# Patient Record
Sex: MALE | Race: WHITE | NOT HISPANIC OR LATINO | Employment: OTHER | URBAN - METROPOLITAN AREA
[De-identification: names, ages, dates, MRNs, and addresses within clinical notes are randomized per-mention and may not be internally consistent; named-entity substitution may affect disease eponyms.]

---

## 2017-06-20 ENCOUNTER — OFFICE VISIT (OUTPATIENT)
Dept: URGENT CARE | Facility: CLINIC | Age: 67
End: 2017-06-20
Payer: MEDICARE

## 2017-06-20 VITALS
HEART RATE: 73 BPM | SYSTOLIC BLOOD PRESSURE: 150 MMHG | DIASTOLIC BLOOD PRESSURE: 70 MMHG | TEMPERATURE: 98 F | BODY MASS INDEX: 27.65 KG/M2 | OXYGEN SATURATION: 95 % | WEIGHT: 184.5 LBS

## 2017-06-20 DIAGNOSIS — E11.8 TYPE 2 DIABETES MELLITUS WITH COMPLICATION, UNSPECIFIED LONG TERM INSULIN USE STATUS: ICD-10-CM

## 2017-06-20 DIAGNOSIS — I25.10 CORONARY ARTERIOSCLEROSIS IN NATIVE ARTERY: ICD-10-CM

## 2017-06-20 PROBLEM — F19.20 ADDICTION TO DRUG: Status: ACTIVE | Noted: 2017-04-13

## 2017-06-20 PROBLEM — S72.143A CLOSED INTERTROCHANTERIC FRACTURE OF FEMUR: Status: ACTIVE | Noted: 2017-05-10

## 2017-06-20 PROBLEM — K57.90 DIVERTICULOSIS OF INTESTINE: Status: ACTIVE | Noted: 2017-06-20

## 2017-06-20 PROBLEM — R09.89 PULMONARY CONGESTION: Status: ACTIVE | Noted: 2017-05-26

## 2017-06-20 PROBLEM — R00.1 BRADYCARDIA: Status: ACTIVE | Noted: 2017-05-15

## 2017-06-20 PROCEDURE — 99999 PR PBB SHADOW E&M-EST. PATIENT-LVL V: CPT | Mod: PBBFAC,,, | Performed by: NURSE PRACTITIONER

## 2017-06-20 PROCEDURE — 87070 CULTURE OTHR SPECIMN AEROBIC: CPT

## 2017-06-20 PROCEDURE — 99214 OFFICE O/P EST MOD 30 MIN: CPT | Mod: S$PBB,,, | Performed by: NURSE PRACTITIONER

## 2017-06-20 PROCEDURE — 1159F MED LIST DOCD IN RCRD: CPT | Mod: ,,, | Performed by: NURSE PRACTITIONER

## 2017-06-20 PROCEDURE — 99215 OFFICE O/P EST HI 40 MIN: CPT | Mod: PBBFAC,PO | Performed by: NURSE PRACTITIONER

## 2017-06-20 RX ORDER — MUPIROCIN 20 MG/G
OINTMENT TOPICAL 3 TIMES DAILY
Qty: 1 TUBE | Refills: 0 | Status: SHIPPED | OUTPATIENT
Start: 2017-06-20 | End: 2017-06-30

## 2017-06-20 RX ORDER — CLINDAMYCIN HYDROCHLORIDE 300 MG/1
300 CAPSULE ORAL 3 TIMES DAILY
Qty: 30 CAPSULE | Refills: 0 | Status: SHIPPED | OUTPATIENT
Start: 2017-06-20 | End: 2017-06-30

## 2017-06-20 NOTE — PROGRESS NOTES
Subjective:      Patient ID: Richard Renae is a 67 y.o. male.    Chief Complaint: Wound Infection    Mr. Renae presents to Urgent Care today with complaints of pain and drainage to left neck wound. He had carotid artery surgery with Dr. Kruse on 5/25/17. Started with increased pain and swelling for the last few days. He saw an associate in Dr. Kruse's office yesterday who said the incision didn't look like he needed antibiotics. Since that time, pain has worsened and he is now having a pretty significant amount of drainage from the wound. He reports chills and fatigue for a couple of days. Nausea and one episode of diarrhea since last night.       Review of Systems   Constitutional: Positive for chills and fatigue. Negative for fever.   HENT: Negative.    Respiratory: Negative.    Cardiovascular: Negative.    Gastrointestinal: Negative.    Musculoskeletal: Positive for neck pain (left neck at site of incision).   Skin: Positive for wound.   Neurological: Negative.    Hematological: Bruises/bleeds easily (takes aspirin and pletal).       Objective:   BP (!) 150/70 (BP Location: Left arm, Patient Position: Sitting, BP Method: Manual)   Pulse 73   Temp 98.1 °F (36.7 °C) (Tympanic)   Wt 83.7 kg (184 lb 8.4 oz)   SpO2 95%   BMI 27.65 kg/m²   Physical Exam   Constitutional: He is oriented to person, place, and time. He appears well-developed and well-nourished. No distress.   HENT:   Head: Normocephalic and atraumatic.   Neck: Normal range of motion. Neck supple.       Cardiovascular: Normal rate.    Pulmonary/Chest: Effort normal. No respiratory distress.   Musculoskeletal: Normal range of motion.   Lymphadenopathy:     He has no cervical adenopathy.   Neurological: He is alert and oriented to person, place, and time.   Skin: Skin is warm and dry. No rash noted. He is not diaphoretic.   Nursing note and vitals reviewed.    Assessment:      1. Wound infection after surgery, initial encounter       Plan:   Wound  infection after surgery, initial encounter  -     clindamycin (CLEOCIN) 300 MG capsule; Take 1 capsule (300 mg total) by mouth 3 (three) times daily.  Dispense: 30 capsule; Refill: 0  -     mupirocin (BACTROBAN) 2 % ointment; Apply topically 3 (three) times daily.  Dispense: 1 Tube; Refill: 0  -     CULTURE, AEROBIC  (SPECIFY SOURCE)    Advised Mr. Renae to be seen within about 24 hours with Dr. Kruse for recheck of the neck.   ER if any worsening at all. Strong ER warnings given due to location of wound.  Gauze dressing applied due to amount of drainage present.  Instructions, follow up, and supportive care as per AVS.  AVS provided and reviewed with patient including importance of antibiotic compliance, supportive care, follow up, and red flag symptoms. Patient verbalizes understanding and agrees with treatment plan. Discharged from Urgent Care in stable condition.

## 2017-06-20 NOTE — PATIENT INSTRUCTIONS
Please go to the ER or see Dr. Kruse immediately if you feel like symptoms are worsening at all.  Follow up with Dr. Kruse as soon as possible.   Bactroban ointment to wound three times daily.   Keep area clean and dry. Wash with mild non-scented soap (Dove, Dial) once or twice daily and pat dry.  May apply gauze dressing if needed for draining of wound.       Preventing Surgical Site Infections  One risk of having surgery is an infection at the surgical site. The surgical site is any cut the surgeon makes in the skin to do the operation. Surgical site infections can range from minor to severe or even fatal. This sheet tells you more about surgical site infections, what hospitals are doing to prevent them, and how they are treated if they do occur. It also tells you what you can do to prevent these infections.  What causes surgical site infections?     Covering a wound with a sterile dressing helps prevent infection.   Germs are everywhere. Theyre on your skin, in the air, and on things you touch. Many germs are good. Some are harmful. Surgical site infections occur when harmful germs enter your body through the incision in your skin. Some infections are caused by germs that are in the air or on objects. But most are caused by germs found on and in your own body.  Who is at risk for surgical site infections?  Anyone can have a surgical site infection. Your risk is greater if you:  · Are an older adult  · Have a weakened immune system or other health conditions or illnesses such as diabetes  · Are a smoker  · Have certain types of operations, such as abdominal surgery  · Don't eat enough healthy foods (malnourished)  · Are very overweight  What are the symptoms of a surgical site infection?  · The infection usually begins with increased skin redness, pain, and swelling around the incision. Later you may notice a cloudy or greenish-yellow discharge from the incision. The incision may separate or open up. You are  also likely to have a fever and may feel very ill.  · Symptoms can appear any time from hours to weeks after surgery. Implants such as an artificial knee or hip can become infected at any time after the operation.  How are surgical site infections treated?  · Several infections are treated with antibiotics. The type of medicine you get will depend on the germ causing the infection. Most serious wound infections need surgery.  · An infected skin wound may be reopened and cleaned. Sometimes, deep wounds need to be packed with gauze that is changed often until the wound begins to heal from the inside out. Your healthcare provider will figure out the best care needed to treat your surgical site infection.  · If an infection occurs where an implant is placed, the implant may be removed.  · If you have an infection deeper in your body, you may need another operation to treat it.  What hospitals do to prevent surgical site infections  Many hospitals take these steps to help prevent surgical site infections:  · Handwashing. Before the operation, your surgeon and all operating room staff scrub their hands and arms with an antiseptic soap.  · Clean skin. The site where your incision is made is carefully cleaned with an antiseptic solution.  · Sterile clothing and drapes. Members of your surgical team wear medical uniforms (scrub suits), long-sleeved surgical gowns, masks, caps, shoe covers, and sterile gloves. Your body is fully covered with a large sterile sheet (sterile drape) except for the spot where the incision is made.  · Clean air. Operating rooms have special air filters and positive pressure airflow to prevent unfiltered air from entering the room.  · Careful use of antibiotics. Antibiotics are given no more than 60 minutes before the incision is made and stopped within 24 hours after surgery. This helps kill germs but avoids problems that can occur when antibiotics are taken longer.  · Controlled blood sugar  levels. Your blood sugar level may rise because of the stress of the surgery. Your blood sugar level is watched closely to make sure it stays within a normal range. High blood sugar delays wound healing and increases the chances for infection.  · Controlled body temperature. A lower-than-normal temperature during or after surgery prevents oxygen from reaching the wound and makes it harder for your body to fight infection. Hospitals may warm IV fluids, increase the temperature in the operating room, and provide warm-air blankets.  · Proper hair removal. Any hair that must be removed is clipped, not shaved with a razor. This prevents tiny nicks and cuts through which germs can enter.  · Wound care. After surgery, a closed wound is covered with a sterile dressing for a day or two. Open wounds are packed with sterile gauze and covered with a sterile dressing.  What you can do to prevent surgical site infections  · Ask questions. Learn what your hospital is doing to prevent infection.  · If your doctor tells you to, shower or bathe with antiseptic soap the night before and the day of your operation. Follow the instructions you are given. You may be asked to use a special antibiotic cleanser that you dont rinse off.  · If you smoke, stop or cut down. Ask your doctor about ways to quit.  · Take antibiotics only when your healthcare provider tells you to. Using antibiotics when theyre not needed can create germs that are harder to kill. Also, finish all your antibiotics, even if you feel better.  · Be sure healthcare workers clean their hands with soap and water or with an alcohol-based hand  before and after caring for you. Dont be afraid to remind them.  · After surgery, eat healthy foods. Care for your incision as directed by your doctor or nurse.     When to seek medical care  Call your healthcare provider if you have any of the following:  · Increased soreness, pain, or tenderness at the surgical site  · A  red streak, increased redness, or puffiness near the incision  · Yellowish, cloudy, or bad-smelling discharge from the incision  · Stitches that dissolve before the wound heals  · Fever of 100.4°F (38°C) or higher, or as directed by your healthcare provider  · A tired feeling that doesnt go away   Date Last Reviewed: 10/22/2014  © 7005-6429 [employer]. [address] [place] [address], [place] [address]. All rights reserved. This information is not intended as a substitute for professional medical care. Always follow your healthcare professional's instructions.

## 2017-06-22 LAB — BACTERIA SPEC AEROBE CULT: NORMAL

## 2018-04-13 ENCOUNTER — OFFICE VISIT (OUTPATIENT)
Dept: URGENT CARE | Facility: CLINIC | Age: 68
End: 2018-04-13
Payer: MEDICARE

## 2018-04-13 ENCOUNTER — HOSPITAL ENCOUNTER (OUTPATIENT)
Dept: RADIOLOGY | Facility: HOSPITAL | Age: 68
Discharge: HOME | End: 2018-04-13
Attending: PHYSICIAN ASSISTANT
Payer: MEDICARE

## 2018-04-13 VITALS
BODY MASS INDEX: 27.69 KG/M2 | RESPIRATION RATE: 16 BRPM | SYSTOLIC BLOOD PRESSURE: 132 MMHG | TEMPERATURE: 97 F | OXYGEN SATURATION: 96 % | HEIGHT: 69 IN | WEIGHT: 186.94 LBS | HEART RATE: 50 BPM | DIASTOLIC BLOOD PRESSURE: 60 MMHG

## 2018-04-13 DIAGNOSIS — S80.812A ABRASION OF LEFT LOWER EXTREMITY, INITIAL ENCOUNTER: ICD-10-CM

## 2018-04-13 DIAGNOSIS — M25.562 ACUTE PAIN OF LEFT KNEE: ICD-10-CM

## 2018-04-13 DIAGNOSIS — M25.562 ACUTE PAIN OF LEFT KNEE: Primary | ICD-10-CM

## 2018-04-13 PROCEDURE — 99999 PR PBB SHADOW E&M-EST. PATIENT-LVL IV: CPT | Mod: PBBFAC,,, | Performed by: PHYSICIAN ASSISTANT

## 2018-04-13 PROCEDURE — 73562 X-RAY EXAM OF KNEE 3: CPT | Mod: 26,59,RT, | Performed by: RADIOLOGY

## 2018-04-13 PROCEDURE — 99214 OFFICE O/P EST MOD 30 MIN: CPT | Mod: PBBFAC,25,PO | Performed by: PHYSICIAN ASSISTANT

## 2018-04-13 PROCEDURE — 99214 OFFICE O/P EST MOD 30 MIN: CPT | Mod: S$PBB,,, | Performed by: PHYSICIAN ASSISTANT

## 2018-04-13 PROCEDURE — 73562 X-RAY EXAM OF KNEE 3: CPT | Mod: TC,FY,PO,RT

## 2018-04-13 PROCEDURE — 73564 X-RAY EXAM KNEE 4 OR MORE: CPT | Mod: 26,LT,, | Performed by: RADIOLOGY

## 2018-04-13 RX ORDER — DIOSMIN COMPLEX NO.1 630 MG
TABLET ORAL
COMMUNITY
Start: 2018-03-15

## 2018-04-13 RX ORDER — MUPIROCIN 20 MG/G
OINTMENT TOPICAL 3 TIMES DAILY
Qty: 22 G | Refills: 0 | Status: SHIPPED | OUTPATIENT
Start: 2018-04-13 | End: 2018-12-19 | Stop reason: SDUPTHER

## 2018-04-13 RX ORDER — VITAMIN E 268 MG
400 CAPSULE ORAL DAILY
COMMUNITY

## 2018-04-13 RX ORDER — ATORVASTATIN CALCIUM 40 MG/1
40 TABLET, FILM COATED ORAL DAILY
COMMUNITY

## 2018-04-13 NOTE — PATIENT INSTRUCTIONS
Arthralgia    Arthralgia is the term for pain in or around the joint. It is a symptom, not a disease. This pain may involve one or more joints. In some cases, the pain moves from joint to joint.  There are many causes for joint pain. These include:  · Injury  · Osteoarthritis (wearing out of the joint surface)  · Gout (inflammation of the joint due to crystals in the joint fluid)  · Infection inside the joint    · Bursitis (inflammation of the fluid-filled sacs around the joint)  · Autoimmune disorders such as rheumatoid arthritis or lupus  · Tendonitis (inflammation of chords that attach muscle to bone)  Home care  · Rest the involved joint(s) until your symptoms improve.   · You may be prescribed pain medicine. If none is prescribed, you may use acetaminophen or ibuprofen to control pain and inflammation.  Follow-up care  Follow up with your healthcare provider or as advised.  When to seek medical advice  Contact your healthcare provider right away if any of the following occurs:  · Pain, swelling, or redness of joint increases  · Pain worsens or recurs after a period of improvement  · Pain moves to other joints  · You cannot bear weight on the affected joint   · You cannot move the affected joint  · Joint appears deformed  · New rash appears  · Fever of 100.4ºF (38ºC) or higher, or as directed by your healthcare provider  Date Last Reviewed: 3/1/2017  © 2344-3050 [employer]. [address] [place] [address], [place] [address]. All rights reserved. This information is not intended as a substitute for professional medical care. Always follow your healthcare professional's instructions.

## 2018-04-13 NOTE — PROGRESS NOTES
"Subjective:      Patient ID: Richard Renae is a 67 y.o. male.    Chief Complaint: Knee Injury    Patient complains of twisting his knee several times over the past few days  Knee replacement in 2008 but has had problems since that time, per patient's wife has calcium deposits behind knee so unable to flex knee completely      Knee Injury   This is a new problem. The current episode started in the past 7 days. Associated symptoms include arthralgias (L knee) and joint swelling (L knee). Pertinent negatives include no chills, diaphoresis or fever. Treatments tried: Lortab, elevation. The treatment provided mild relief.     Review of Systems   Constitutional: Negative for chills, diaphoresis and fever.   Musculoskeletal: Positive for arthralgias (L knee) and joint swelling (L knee).   Skin: Negative for color change and wound.       Objective:   /60 (BP Location: Right arm, Patient Position: Sitting, BP Method: Small (Manual))   Pulse (!) 50   Temp 96.8 °F (36 °C) (Tympanic)   Resp 16   Ht 5' 8.5" (1.74 m)   Wt 84.8 kg (186 lb 15.2 oz)   SpO2 96%   BMI 28.01 kg/m²   Physical Exam   Constitutional: He appears well-developed and well-nourished. He does not appear ill. No distress.   HENT:   Head: Normocephalic and atraumatic.   Cardiovascular: Normal rate.    Pulmonary/Chest: Effort normal. No respiratory distress.   Musculoskeletal:        Left knee: He exhibits decreased range of motion (patient only able to flex knee to 70/80 degree angle). He exhibits no swelling, no deformity and no erythema. Tenderness (generalized) found.   Skin: Skin is warm and dry. Abrasion noted. No rash noted. He is not diaphoretic.        Psychiatric: He has a normal mood and affect. His speech is normal and behavior is normal. Thought content normal.     Assessment:      1. Acute pain of left knee    2. Abrasion of left lower extremity, initial encounter       Plan:   Acute pain of left knee  -     Cancel: X-Ray Knee 1 or 2 " View Left; Future; Expected date: 04/13/2018    Abrasion of left lower extremity, initial encounter  -     mupirocin (BACTROBAN) 2 % ointment; Apply topically 3 (three) times daily.  Dispense: 22 g; Refill: 0      Follow up w orthopedist (patient currently sees Dr [facility])  Recommend getting a copy of x-rays at [facility] location to bring to ortho follow up   Recommend RICE and prescribed pain medication as needed    Gave handout on arthralgia.  Printed AVS and reviewed treatment plan in detail.    Discussed worsening signs/symptoms and when to return to clinic or go to ED.   Patient expresses understanding and agrees with treatment plan.

## 2018-12-19 ENCOUNTER — OFFICE VISIT (OUTPATIENT)
Dept: URGENT CARE | Facility: CLINIC | Age: 68
End: 2018-12-19
Payer: MEDICARE

## 2018-12-19 ENCOUNTER — HOSPITAL ENCOUNTER (OUTPATIENT)
Dept: RADIOLOGY | Facility: HOSPITAL | Age: 68
Discharge: HOME | End: 2018-12-19
Attending: NURSE PRACTITIONER
Payer: MEDICARE

## 2018-12-19 VITALS
WEIGHT: 182.75 LBS | SYSTOLIC BLOOD PRESSURE: 142 MMHG | OXYGEN SATURATION: 97 % | TEMPERATURE: 98 F | BODY MASS INDEX: 27.38 KG/M2 | DIASTOLIC BLOOD PRESSURE: 66 MMHG | HEART RATE: 74 BPM

## 2018-12-19 DIAGNOSIS — M25.462 PAIN AND SWELLING OF LEFT KNEE: Primary | ICD-10-CM

## 2018-12-19 DIAGNOSIS — M25.562 PAIN AND SWELLING OF LEFT KNEE: ICD-10-CM

## 2018-12-19 DIAGNOSIS — S81.002A UNSPECIFIED OPEN WOUND, LEFT KNEE, INITIAL ENCOUNTER: ICD-10-CM

## 2018-12-19 DIAGNOSIS — M25.462 PAIN AND SWELLING OF LEFT KNEE: ICD-10-CM

## 2018-12-19 DIAGNOSIS — M25.562 PAIN AND SWELLING OF LEFT KNEE: Primary | ICD-10-CM

## 2018-12-19 PROCEDURE — 99999 PR PBB SHADOW E&M-EST. PATIENT-LVL III: CPT | Mod: PBBFAC,,, | Performed by: NURSE PRACTITIONER

## 2018-12-19 PROCEDURE — 99213 OFFICE O/P EST LOW 20 MIN: CPT | Mod: S$PBB,25,, | Performed by: NURSE PRACTITIONER

## 2018-12-19 PROCEDURE — 99213 OFFICE O/P EST LOW 20 MIN: CPT | Mod: PBBFAC,25,PO | Performed by: NURSE PRACTITIONER

## 2018-12-19 PROCEDURE — 96372 THER/PROPH/DIAG INJ SC/IM: CPT | Mod: PBBFAC,PO

## 2018-12-19 PROCEDURE — 73560 X-RAY EXAM OF KNEE 1 OR 2: CPT | Mod: TC,FY,PO,LT

## 2018-12-19 PROCEDURE — 73560 X-RAY EXAM OF KNEE 1 OR 2: CPT | Mod: 26,LT,, | Performed by: RADIOLOGY

## 2018-12-19 RX ORDER — CLINDAMYCIN HYDROCHLORIDE 300 MG/1
300 CAPSULE ORAL 3 TIMES DAILY
Qty: 24 CAPSULE | Refills: 0 | Status: SHIPPED | OUTPATIENT
Start: 2018-12-19 | End: 2018-12-27

## 2018-12-19 RX ORDER — MUPIROCIN 20 MG/G
OINTMENT TOPICAL 3 TIMES DAILY
Qty: 22 G | Refills: 0 | Status: SHIPPED | OUTPATIENT
Start: 2018-12-19

## 2018-12-19 RX ORDER — LIDOCAINE HYDROCHLORIDE 10 MG/ML
3 INJECTION, SOLUTION EPIDURAL; INFILTRATION; INTRACAUDAL; PERINEURAL
Status: COMPLETED | OUTPATIENT
Start: 2018-12-19 | End: 2018-12-19

## 2018-12-19 RX ORDER — NAPROXEN SODIUM 220 MG
220 TABLET ORAL 2 TIMES DAILY WITH MEALS
COMMUNITY

## 2018-12-19 RX ORDER — CEFTRIAXONE 1 G/1
1 INJECTION, POWDER, FOR SOLUTION INTRAMUSCULAR; INTRAVENOUS
Status: COMPLETED | OUTPATIENT
Start: 2018-12-19 | End: 2018-12-19

## 2018-12-19 RX ADMIN — CEFTRIAXONE SODIUM 1 G: 1 INJECTION, POWDER, FOR SOLUTION INTRAMUSCULAR; INTRAVENOUS at 07:12

## 2018-12-19 RX ADMIN — LIDOCAINE HYDROCHLORIDE 30 MG: 10 INJECTION, SOLUTION EPIDURAL; INFILTRATION; INTRACAUDAL; PERINEURAL at 07:12

## 2018-12-20 NOTE — PATIENT INSTRUCTIONS
Abscess (Incision & Drainage)  An abscess is sometimes called a boil. It happens when bacteria get trapped under the skin and start to grow. Pus forms inside the abscess as the body responds to the bacteria. An abscess can happen with an insect bite, ingrown hair, blocked oil gland, pimple, cyst, or puncture wound.  Your healthcare provider has drained the pus from your abscess. If the abscess pocket was large, your healthcare provider may have put in gauze packing. Your provider will need to remove it on your next visit. He or she may also replace it at that time. You may not need antibiotics to treat a simple abscess, unless the infection is spreading into the skin around the wound (cellulitis).  The wound will take about 1 to 2 weeks to heal, depending on the size of the abscess. Healthy tissue will grow from the bottom and sides of the opening until it seals over.  Home care  These tips can help your wound heal:  · The wound may drain for the first 2 days. Cover the wound with a clean dry dressing. Change the dressing if it becomes soaked with blood or pus.  · If a gauze packing was placed inside the abscess pocket, you may be told to remove it yourself. You may do this in the shower. Once the packing is removed, you should wash the area in the shower, or clean the area as directed by your provider. Continue to do this until the skin opening has closed. Make sure you wash your hands after changing the packing or cleaning the wound.  · If you were prescribed antibiotics, take them as directed until they are all gone.  · You may use acetaminophen or ibuprofen to control pain, unless another pain medicine was prescribed. If you have liver disease or ever had a stomach ulcer, talk with your doctor before using these medicines.  Follow-up care  Follow up with your healthcare provider, or as advised. If a gauze packing was put in your wound, it should be removed in 1 to 2 days. Check your wound every day for any  signs that the infection is getting worse. The signs are listed below.  When to seek medical advice  Call your healthcare provider right away if any of these occur:  · Increasing redness or swelling  · Red streaks in the skin leading away from the wound  · Increasing local pain or swelling  · Continued pus draining from the wound 2 days after treatment  · Fever of 100.4ºF (38ºC) or higher, or as directed by your healthcare provider  · Boil returns when you are at home  Date Last Reviewed: 9/1/2016  © 5807-0843 [employer]. [address] [place] [address]. All rights reserved. This information is not intended as a substitute for professional medical care. Always follow your healthcare professional's instructions.

## 2018-12-20 NOTE — PROGRESS NOTES
Subjective:       Patient ID: Richard Renae is a 68 y.o. male.    Chief Complaint: No chief complaint on file.    68 year old male presents to Urgent Care with reports of left knee pain from abscess just below left knee. Denies any other problems or concerns at this time.       Abscess   Chronicity:  NewProgression Since Onset: gradually worsening  Location:  Leg (left knee)  Associated Symptoms: no fever, no chills  Treatments Tried:  Nothing    Review of Systems   Constitutional: Negative for appetite change, chills and fever.   HENT: Negative for ear pain, sinus pressure, sore throat and trouble swallowing.    Eyes: Negative for visual disturbance.   Respiratory: Negative for shortness of breath.    Cardiovascular: Negative for chest pain.   Gastrointestinal: Negative for abdominal pain, diarrhea, nausea and vomiting.   Endocrine: Negative for cold intolerance, polyphagia and polyuria.   Genitourinary: Negative for decreased urine volume and dysuria.   Musculoskeletal: Negative for back pain.   Skin: Positive for wound. Negative for rash.        Left knee   Allergic/Immunologic: Negative for environmental allergies and food allergies.   Neurological: Negative for dizziness, tremors, weakness and numbness.   Hematological: Does not bruise/bleed easily.   Psychiatric/Behavioral: Negative for confusion and hallucinations. The patient is not nervous/anxious and is not hyperactive.    All other systems reviewed and are negative.      Objective:     Physical Exam   Constitutional: He is oriented to person, place, and time. He appears well-developed and well-nourished.   HENT:   Head: Normocephalic.   Right Ear: External ear normal.   Left Ear: External ear normal.   Nose: Nose normal.   Mouth/Throat: Oropharynx is clear and moist.   Eyes: Conjunctivae and EOM are normal. Pupils are equal, round, and reactive to light.   Neck: Normal range of motion. Neck supple. No JVD present.   Cardiovascular: Normal rate, regular  rhythm, normal heart sounds and intact distal pulses.   Pulmonary/Chest: Effort normal and breath sounds normal. He has no wheezes.   Abdominal: Soft. Bowel sounds are normal. There is no tenderness.   Musculoskeletal:        Left knee: He exhibits swelling.        Legs:  Lymphadenopathy:     He has no cervical adenopathy.   Neurological: He is alert and oriented to person, place, and time.   Skin: Skin is warm and dry.   Psychiatric: He has a normal mood and affect. His behavior is normal. Judgment and thought content normal.   Nursing note and vitals reviewed.      INCISION AND DRAINAGE PROCEDURE:  Verbal consent received from patient and verbalized understanding of procedure, risks,and benefits.   Area cleaned with betadine and then normal saline.   Lidocaine 1% as local anesthetic, 3 mls used.   11 inchblade scalpel used to open abscess, 1 cm incision made    Minimal amount of purulent drainage   LPN then cleaned and dressed area with sterile gauze and Tegaderm.   Patient tolerated procedure well, no complications.     Assessment:     1. Pain and swelling of left knee    2. Unspecified open wound, left knee, initial encounter      Plan:   Diagnoses and all orders for this visit:    Pain and swelling of left knee  -     Cancel: X-ray Knee Ortho Left; Future    Unspecified open wound, left knee, initial encounter  -     mupirocin (BACTROBAN) 2 % ointment; Apply topically 3 (three) times daily.    Other orders  -     lidocaine (PF) 10 mg/ml (1%) injection 30 mg  -     clindamycin (CLEOCIN) 300 MG capsule; Take 1 capsule (300 mg total) by mouth 3 (three) times daily. for 8 days  -     cefTRIAXone injection 1 g

## 2018-12-26 ENCOUNTER — TELEPHONE (OUTPATIENT)
Dept: INTERNAL MEDICINE | Facility: CLINIC | Age: 68
End: 2018-12-26

## 2018-12-26 NOTE — TELEPHONE ENCOUNTER
----- Message from ZEYAD Henry sent at 12/20/2018  2:52 PM CST -----  Notify patient of the following:  No findings to suggest hardware failure or loosening.  Partially visualized intramedullary nail and distal interlocking screw again noted in the distal left femur.  Suggestion of possible mild diffuse subcutaneous soft tissue edema surrounding the left knee which appears similar to prior.  No definite joint effusion appreciated.  Scattered vascular calcifications noted. Follow up with ortho/PCP

## 2018-12-27 ENCOUNTER — TELEPHONE (OUTPATIENT)
Dept: URGENT CARE | Facility: CLINIC | Age: 68
End: 2018-12-27

## 2018-12-27 NOTE — TELEPHONE ENCOUNTER
----- Message from Delmis Conner sent at 12/26/2018  1:26 PM CST -----  Contact: pt  Returning a call.

## 2018-12-27 NOTE — TELEPHONE ENCOUNTER
Notes recorded by Apolonia Pruitt MA on 12/27/2018 at 4:17 PM CST  Notified patient of the following:   No findings to suggest hardware failure or loosening.  Partially visualized intramedullary nail and distal interlocking screw again noted in the distal left femur.  Suggestion of possible mild diffuse subcutaneous soft tissue edema surrounding the left knee which appears similar to prior.  No definite joint effusion appreciated.  Scattered vascular calcifications noted. Follow up with ortho/PCP. Patient states he will call to schedule ortho and appointment with PCP. Patient verbalized understanding.

## 2018-12-27 NOTE — TELEPHONE ENCOUNTER
Tried calling pt informed by his wife that pt can be reach at home [phone] or cell [phone].

## 2018-12-27 NOTE — TELEPHONE ENCOUNTER
----- Message from Kaylie Leyva sent at 12/26/2018  1:36 PM CST -----  Contact: self   returning call.pt unsure of what the call was in regards to.please call back at [phone].      Thanks,  Kaylie Leyva

## 2018-12-28 ENCOUNTER — TELEPHONE (OUTPATIENT)
Dept: URGENT CARE | Facility: CLINIC | Age: 68
End: 2018-12-28

## 2018-12-28 NOTE — TELEPHONE ENCOUNTER
----- Message from Vickie Banks sent at 12/27/2018  3:16 PM CST -----  Contact: pt   Pt was returning nurse call    [phone] (home)

## 2019-03-20 ENCOUNTER — OFFICE VISIT (OUTPATIENT)
Dept: UROLOGY | Facility: CLINIC | Age: 69
End: 2019-03-20
Payer: MEDICARE

## 2019-03-20 ENCOUNTER — LAB VISIT (OUTPATIENT)
Dept: LAB | Facility: HOSPITAL | Age: 69
End: 2019-03-20
Attending: UROLOGY
Payer: MEDICARE

## 2019-03-20 VITALS — BODY MASS INDEX: 27.07 KG/M2 | HEIGHT: 69 IN | WEIGHT: 182.75 LBS

## 2019-03-20 DIAGNOSIS — E29.1 HYPOGONADISM IN MALE: ICD-10-CM

## 2019-03-20 DIAGNOSIS — Z12.5 PROSTATE CANCER SCREENING: ICD-10-CM

## 2019-03-20 DIAGNOSIS — Z12.5 PROSTATE CANCER SCREENING: Primary | ICD-10-CM

## 2019-03-20 LAB
BILIRUB SERPL-MCNC: NORMAL MG/DL
BLOOD URINE, POC: NORMAL
COLOR, POC UA: YELLOW
COMPLEXED PSA SERPL-MCNC: 0.88 NG/ML
GLUCOSE UR QL STRIP: NORMAL
KETONES UR QL STRIP: NORMAL
LEUKOCYTE ESTERASE URINE, POC: NORMAL
NITRITE, POC UA: NORMAL
PH, POC UA: 7
PROTEIN, POC: NORMAL
SPECIFIC GRAVITY, POC UA: 1.01
UROBILINOGEN, POC UA: NORMAL

## 2019-03-20 PROCEDURE — 99214 OFFICE O/P EST MOD 30 MIN: CPT | Mod: S$PBB,,, | Performed by: UROLOGY

## 2019-03-20 PROCEDURE — 82040 ASSAY OF SERUM ALBUMIN: CPT

## 2019-03-20 PROCEDURE — 84153 ASSAY OF PSA TOTAL: CPT

## 2019-03-20 PROCEDURE — 99214 PR OFFICE/OUTPT VISIT, EST, LEVL IV, 30-39 MIN: ICD-10-PCS | Mod: S$PBB,,, | Performed by: UROLOGY

## 2019-03-20 PROCEDURE — 36415 COLL VENOUS BLD VENIPUNCTURE: CPT

## 2019-03-20 PROCEDURE — 99999 PR PBB SHADOW E&M-EST. PATIENT-LVL II: ICD-10-PCS | Mod: PBBFAC,,, | Performed by: UROLOGY

## 2019-03-20 PROCEDURE — 99212 OFFICE O/P EST SF 10 MIN: CPT | Mod: PBBFAC | Performed by: UROLOGY

## 2019-03-20 PROCEDURE — 99999 PR PBB SHADOW E&M-EST. PATIENT-LVL II: CPT | Mod: PBBFAC,,, | Performed by: UROLOGY

## 2019-03-20 PROCEDURE — 81002 URINALYSIS NONAUTO W/O SCOPE: CPT | Mod: PBBFAC | Performed by: UROLOGY

## 2019-03-20 RX ORDER — PAPAVERINE HYDROCHLORIDE 30 MG/ML
INJECTION INTRAMUSCULAR; INTRAVENOUS
Qty: 10 ML | Refills: 5 | Status: SHIPPED | OUTPATIENT
Start: 2019-03-20 | End: 2019-11-20 | Stop reason: SDUPTHER

## 2019-03-20 NOTE — PROGRESS NOTES
Chief Complaint: Prostate Cancer Screening    HPI:   3/20/19: Back for a checkup.  Never sucessfully adopted the trimix.  Asks about T but doesn't want to check on it.  Reviewed history in detail.  9/9/16: Went to [facility] and started their trimix and it worked some but not great.  He hasn't followed up.  Had some outside labs from 6/16 that shows T of 398. Backed off the caffeine and started the cialis daily and his LUTS are better.  Satisfied overall.  Would like to try trimix here.  9/9/15: 68 y.o. man referred for prostate cancer screening.  Had seen Dr. Irene for many years but wants to change because of scheduling problems there. No abd/pelvic pain and no exac/rel factors.  No urolithiasis. No gross hematuria. No family history of prostate cancer.  Nocturia x0, but daytime frequency.  Stream is is okay.  Has been going on for a long time.  Drinks 3 cups of coffee mainly in the morning but also in the afternoon and evening. Took flomax in the past but didn't like the retrograde ejaculation.  ED with soft erections that don't last long enough.  Takes the hydrocodone 2-3 times a day.  Took a sample of cialis daily and it helped his ED some. Would have intercourse a couple times a week if all was well.  Says he had a high PSA on one test and low on a recheck, never a biopsy.    Allergies:  Patient has no known allergies.    Medications:  has a current medication list which includes the following prescription(s): albuterol, aspirin, atorvastatin, berb hall/herbal complex no.18, cetirizine, cilostazol, cinnamon bark/chromium/ala, eszopiclone, gabapentin, glipizide-metformin, glucosamine/chondr hall a sod, hydrocodone-acetaminophen , krill-om-3-dha-epa-phospho-ast, losartan, min17/nettle/pumpkin/saw palme, multivitamin, mupirocin, naproxen sodium, resveratrol, sitagliptin, tadalafil, UNABLE TO FIND, UNABLE TO FIND, vasculera, vitamin d, and vitamin e.    Review of Systems:  General: No fever, chills,  fatigability, or weight loss.  Skin: No rashes, itching, or changes in color or texture of skin.  Chest: Denies DISLA, cyanosis, wheezing, cough, and sputum production.  Abdomen: Appetite fine. No weight loss. Denies diarrhea, abdominal pain, hematemesis, or blood in stool.  Musculoskeletal: No joint stiffness or swelling. Denies back pain.  : As above.  All other review of systems negative.    PMH:   has a past medical history of Diabetes mellitus, type 2 and Hyperlipidemia.    PSH:   has a past surgical history that includes Aortic valve replacement (03/09/2015) and Knee surgery (Bilateral, 2013).    FamHx: family history is not on file.    SocHx:  reports that he has been smoking.  He does not have any smokeless tobacco history on file. He reports that he drinks alcohol. He reports that he does not use drugs.      Physical Exam:  There were no vitals filed for this visit.  General: A&Ox3, no apparent distress, no deformities  Neck: No masses, normal thyroid  Lungs: normal inspiration, no use of accessory muscles  Heart: normal pulse, no arrhythmias  Abdomen: Soft, NT, ND, no masses, no hernias, no hepatosplenomegaly  Lymphatic: Neck and groin nodes negative  Skin: The skin is warm and dry. No jaundice.  Ext: No c/c/e.  : Test desc roxy, no abnormalities of epididymus. Penis normal, with normal penile and scrotal skin. Meatus normal. Normal rectal tone, no hemorrhoids. Prost 30 gm no nodules or masses appreciated. SV not palpable. Perineum and anus normal.    Labs/Studies: Urinalysis performed in clinic, summary: UA normal    Impression/Plan:   1. PSA today and 1 year.  T panel when checking PSA.  2. Trimix rx wants to try it again.

## 2019-03-24 LAB
ALBUMIN SERPL-MCNC: 4.7 G/DL (ref 3.6–5.1)
SHBG SERPL-SCNC: 59 NMOL/L (ref 22–77)
TESTOST FREE SERPL-MCNC: 32 PG/ML (ref 46–224)
TESTOST SERPL-MCNC: 408 NG/DL (ref 250–1100)
TESTOSTERONE.FREE+WB SERPL-MCNC: 68.6 NG/DL (ref 110–575)

## 2019-03-25 ENCOUNTER — TELEPHONE (OUTPATIENT)
Dept: UROLOGY | Facility: CLINIC | Age: 69
End: 2019-03-25

## 2019-03-25 DIAGNOSIS — E29.1 HYPOGONADISM IN MALE: Primary | ICD-10-CM

## 2019-03-25 NOTE — TELEPHONE ENCOUNTER
----- Message from Radha Reyes sent at 3/25/2019 11:10 AM CDT -----  Contact: Patient  Type:  Patient Returning Call    Who Called: Jarrett  Who Left Message for Patient: the nurse   Does the patient know what this is regarding?: results  Would the patient rather a call back or a response via [facility]?  Call back  Best Call Back Number: [phone] cell  Additional Information: n/a    Radha Mcallister

## 2019-03-25 NOTE — TELEPHONE ENCOUNTER
Attempted to contact pt to inform him that his free T was low and that Dr. Gunderson has ordered some followup labs; no answer.  Msg left with wife asking pt to return call.

## 2019-03-25 NOTE — TELEPHONE ENCOUNTER
----- Message from Radha Reyes sent at 3/25/2019 11:10 AM CDT -----  Contact: Patient  Type:  Patient Returning Call    Who Called: Jarrett  Who Left Message for Patient: the nurse   Does the patient know what this is regarding?: results  Would the patient rather a call back or a response via [facility]?  Call back  Best Call Back Number: [phone] cell  Additional Information: n/a    Radha Mcallister

## 2019-03-25 NOTE — TELEPHONE ENCOUNTER
----- Message from Hannah Beck sent at 3/25/2019  1:00 PM CDT -----  Contact: pt  Type:  Test Results    Who Called:  Pt   Name of Test (Lab/Mammo/Etc): la  Date of Test: 03/20  Ordering Provider: Dr Gunderson  Where the test was performed: [facility]  Would the patient rather a call back or a response via [facility]? Call back  Best Call Back Number: [phone]  Additional Information:

## 2019-03-26 ENCOUNTER — LAB VISIT (OUTPATIENT)
Dept: LAB | Facility: HOSPITAL | Age: 69
End: 2019-03-26
Attending: UROLOGY
Payer: MEDICARE

## 2019-03-26 DIAGNOSIS — E29.1 HYPOGONADISM IN MALE: ICD-10-CM

## 2019-03-26 LAB
FSH SERPL-ACNC: 42.7 MIU/ML (ref 0.95–11.95)
LH SERPL-ACNC: 13.8 MIU/ML (ref 0.6–12.1)
PROLACTIN SERPL IA-MCNC: 15.9 NG/ML (ref 3.5–19.4)

## 2019-03-26 PROCEDURE — 83001 ASSAY OF GONADOTROPIN (FSH): CPT

## 2019-03-26 PROCEDURE — 83002 ASSAY OF GONADOTROPIN (LH): CPT

## 2019-03-26 PROCEDURE — 36415 COLL VENOUS BLD VENIPUNCTURE: CPT

## 2019-03-26 PROCEDURE — 84146 ASSAY OF PROLACTIN: CPT

## 2019-05-16 ENCOUNTER — OFFICE VISIT (OUTPATIENT)
Dept: UROLOGY | Facility: CLINIC | Age: 69
End: 2019-05-16
Payer: MEDICARE

## 2019-05-16 VITALS
BODY MASS INDEX: 26.81 KG/M2 | HEIGHT: 69 IN | WEIGHT: 181 LBS | SYSTOLIC BLOOD PRESSURE: 118 MMHG | DIASTOLIC BLOOD PRESSURE: 68 MMHG

## 2019-05-16 DIAGNOSIS — N52.9 ERECTILE DYSFUNCTION, UNSPECIFIED ERECTILE DYSFUNCTION TYPE: ICD-10-CM

## 2019-05-16 DIAGNOSIS — E29.1 HYPOGONADISM IN MALE: Primary | ICD-10-CM

## 2019-05-16 LAB
BILIRUB SERPL-MCNC: NORMAL MG/DL
BLOOD URINE, POC: NORMAL
COLOR, POC UA: NORMAL
GLUCOSE UR QL STRIP: NORMAL
KETONES UR QL STRIP: NORMAL
LEUKOCYTE ESTERASE URINE, POC: NORMAL
NITRITE, POC UA: NORMAL
PH, POC UA: 5
PROTEIN, POC: NORMAL
SPECIFIC GRAVITY, POC UA: 1.02
UROBILINOGEN, POC UA: NORMAL

## 2019-05-16 PROCEDURE — 99214 PR OFFICE/OUTPT VISIT, EST, LEVL IV, 30-39 MIN: ICD-10-PCS | Mod: S$PBB,,, | Performed by: UROLOGY

## 2019-05-16 PROCEDURE — 99999 PR PBB SHADOW E&M-EST. PATIENT-LVL III: ICD-10-PCS | Mod: PBBFAC,,, | Performed by: UROLOGY

## 2019-05-16 PROCEDURE — 81002 URINALYSIS NONAUTO W/O SCOPE: CPT | Mod: PBBFAC | Performed by: UROLOGY

## 2019-05-16 PROCEDURE — 99999 PR PBB SHADOW E&M-EST. PATIENT-LVL III: CPT | Mod: PBBFAC,,, | Performed by: UROLOGY

## 2019-05-16 PROCEDURE — 99214 OFFICE O/P EST MOD 30 MIN: CPT | Mod: S$PBB,,, | Performed by: UROLOGY

## 2019-05-16 PROCEDURE — 99213 OFFICE O/P EST LOW 20 MIN: CPT | Mod: PBBFAC | Performed by: UROLOGY

## 2019-05-16 RX ORDER — TRIAMCINOLONE ACETONIDE 1 MG/G
CREAM TOPICAL
COMMUNITY
Start: 2019-01-28

## 2019-05-16 RX ORDER — NAPROXEN 250 MG/1
250 TABLET ORAL
COMMUNITY
Start: 2017-04-22

## 2019-05-16 RX ORDER — METOPROLOL SUCCINATE 25 MG/1
25 TABLET, EXTENDED RELEASE ORAL
COMMUNITY
Start: 2018-09-21 | End: 2020-08-28

## 2019-05-16 RX ORDER — FERROUS GLUCONATE 324(38)MG
5000 TABLET ORAL
COMMUNITY

## 2019-05-16 RX ORDER — TESTOSTERONE GEL, 1% 10 MG/G
5 GEL TRANSDERMAL DAILY
Qty: 30 PACKET | Refills: 5 | Status: SHIPPED | OUTPATIENT
Start: 2019-05-16 | End: 2019-11-20 | Stop reason: SDUPTHER

## 2019-05-16 RX ORDER — DOXYCYCLINE 100 MG/1
100 CAPSULE ORAL
COMMUNITY
Start: 2019-05-08 | End: 2019-06-07

## 2019-05-16 RX ORDER — FUROSEMIDE 20 MG/1
TABLET ORAL
COMMUNITY
Start: 2019-04-15

## 2019-05-16 RX ORDER — MAGNESIUM 30 MG
400 TABLET ORAL
COMMUNITY

## 2019-05-16 NOTE — PROGRESS NOTES
Chief Complaint: Prostate Cancer Screening    HPI:   5/16/19: Trimix rx worked okay.  Free T is low, discussed therapy.    3/20/19: Back for a checkup.  Never sucessfully adopted the trimix.  Asks about T but doesn't want to check on it.  Reviewed history in detail.  9/9/16: Went to [facility] and started their trimix and it worked some but not great.  He hasn't followed up.  Had some outside labs from 6/16 that shows T of 398. Backed off the caffeine and started the cialis daily and his LUTS are better.  Satisfied overall.  Would like to try trimix here.  9/9/15: 68 y.o. man referred for prostate cancer screening.  Had seen Dr. Irene for many years but wants to change because of scheduling problems there. No abd/pelvic pain and no exac/rel factors.  No urolithiasis. No gross hematuria. No family history of prostate cancer.  Nocturia x0, but daytime frequency.  Stream is is okay.  Has been going on for a long time.  Drinks 3 cups of coffee mainly in the morning but also in the afternoon and evening. Took flomax in the past but didn't like the retrograde ejaculation.  ED with soft erections that don't last long enough.  Takes the hydrocodone 2-3 times a day.  Took a sample of cialis daily and it helped his ED some. Would have intercourse a couple times a week if all was well.  Says he had a high PSA on one test and low on a recheck, never a biopsy.    Allergies:  Patient has no known allergies.    Medications:  has a current medication list which includes the following prescription(s): doxycycline, furosemide, metoprolol succinate, naproxen, triamcinolone acetonide 0.1%, albuterol, aspirin, atorvastatin, berb hall/herbal complex no.18, cetirizine, cilostazol, cinnamon bark/chromium/ala, eszopiclone, ferrous gluconate, gabapentin, glipizide-metformin, glucosamine/chondr hall a sod, hydrocodone-acetaminophen , krill-om-3-dha-epa-phospho-ast, losartan, min17/nettle/pumpkin/saw palme, magnesium, multivitamin,  mupirocin, naproxen sodium, papaverine, resveratrol, sitagliptin, tadalafil, UNABLE TO FIND, UNABLE TO FIND, vasculera, vitamin d, and vitamin e.    Review of Systems:  General: No fever, chills, fatigability, or weight loss.  Skin: No rashes, itching, or changes in color or texture of skin.  Chest: Denies DISLA, cyanosis, wheezing, cough, and sputum production.  Abdomen: Appetite fine. No weight loss. Denies diarrhea, abdominal pain, hematemesis, or blood in stool.  Musculoskeletal: No joint stiffness or swelling. Denies back pain.  : As above.  All other review of systems negative.    PMH:   has a past medical history of Diabetes mellitus, type 2 and Hyperlipidemia.    PSH:   has a past surgical history that includes Aortic valve replacement (03/09/2015) and Knee surgery (Bilateral, 2013).    FamHx: family history is not on file.    SocHx:  reports that he has been smoking.  He does not have any smokeless tobacco history on file. He reports that he drinks alcohol. He reports that he does not use drugs.      Physical Exam:  Vitals:    05/16/19 1556   BP: 118/68     General: A&Ox3, no apparent distress, no deformities  Neck: No masses, normal thyroid  Lungs: normal inspiration, no use of accessory muscles  Heart: normal pulse, no arrhythmias  Abdomen: Soft, NT, ND  Skin: The skin is warm and dry. No jaundice.  Ext: No c/c/e.  :   3/19: Test desc roxy, no abnormalities of epididymus. Penis normal, with normal penile and scrotal skin. Meatus normal. Normal rectal tone, no hemorrhoids. Prost 30 gm no nodules or masses appreciated. SV not palpable. Perineum and anus normal.    Labs/Studies: Urinalysis performed in clinic, summary: UA normal  PSA    3/19: 0.88    Impression/Plan:   1. PSA 1 year.   2. Continue trimix  3. T gel and RTC 6 mo with labs.

## 2019-05-22 ENCOUNTER — TELEPHONE (OUTPATIENT)
Dept: UROLOGY | Facility: CLINIC | Age: 69
End: 2019-05-22

## 2019-05-22 NOTE — TELEPHONE ENCOUNTER
notified pt that we have not received PA form from his pharmacy for his Androgel. Advised pt to call his pharmacy and have them fax PA form to our office. Pt verbalized understanding.

## 2019-05-30 ENCOUNTER — TELEPHONE (OUTPATIENT)
Dept: UROLOGY | Facility: CLINIC | Age: 69
End: 2019-05-30

## 2019-05-30 NOTE — TELEPHONE ENCOUNTER
Returned call to pt; informed him that the PA for his testosterone has been completed and we are waiting on the approval.  Will notify pt once done.

## 2019-05-31 ENCOUNTER — TELEPHONE (OUTPATIENT)
Dept: UROLOGY | Facility: CLINIC | Age: 69
End: 2019-05-31

## 2019-05-31 NOTE — TELEPHONE ENCOUNTER
Attempted to contact pt to inform him that his Androgel was not approved by his insurance company; no answer. LM on voice mail.  Copy of decision will be scanned into his chart.

## 2019-06-24 ENCOUNTER — TELEPHONE (OUTPATIENT)
Dept: UROLOGY | Facility: CLINIC | Age: 69
End: 2019-06-24

## 2019-06-24 NOTE — TELEPHONE ENCOUNTER
Spoke with pt's wife, states [facility] & will need a copy of his Testosterone panel to take with him. Faxed to a secure fax .

## 2019-06-24 NOTE — TELEPHONE ENCOUNTER
----- Message from Hannah Beck sent at 6/24/2019  2:17 PM CDT -----  Contact: pt   Pt stated he's calling for a copy of test results to [facility] to the [facility] for medication that's not covered by [employer], call wife can be reached at [phone] Ms Li, Thanks

## 2019-11-15 ENCOUNTER — LAB VISIT (OUTPATIENT)
Dept: LAB | Facility: HOSPITAL | Age: 69
End: 2019-11-15
Attending: UROLOGY
Payer: MEDICARE

## 2019-11-15 DIAGNOSIS — N52.9 ERECTILE DYSFUNCTION, UNSPECIFIED ERECTILE DYSFUNCTION TYPE: ICD-10-CM

## 2019-11-15 DIAGNOSIS — E29.1 HYPOGONADISM IN MALE: ICD-10-CM

## 2019-11-15 LAB — HCT VFR BLD AUTO: 38.4 % (ref 40–54)

## 2019-11-15 PROCEDURE — 80076 HEPATIC FUNCTION PANEL: CPT

## 2019-11-15 PROCEDURE — 82040 ASSAY OF SERUM ALBUMIN: CPT

## 2019-11-15 PROCEDURE — 85014 HEMATOCRIT: CPT

## 2019-11-16 LAB
ALBUMIN SERPL BCP-MCNC: 3.9 G/DL (ref 3.5–5.2)
ALP SERPL-CCNC: 77 U/L (ref 55–135)
ALT SERPL W/O P-5'-P-CCNC: 24 U/L (ref 10–44)
AST SERPL-CCNC: 17 U/L (ref 10–40)
BILIRUB DIRECT SERPL-MCNC: 0.2 MG/DL (ref 0.1–0.3)
BILIRUB SERPL-MCNC: 0.3 MG/DL (ref 0.1–1)
PROT SERPL-MCNC: 7 G/DL (ref 6–8.4)

## 2019-11-20 ENCOUNTER — OFFICE VISIT (OUTPATIENT)
Dept: UROLOGY | Facility: CLINIC | Age: 69
End: 2019-11-20
Payer: MEDICARE

## 2019-11-20 VITALS
DIASTOLIC BLOOD PRESSURE: 76 MMHG | SYSTOLIC BLOOD PRESSURE: 114 MMHG | BODY MASS INDEX: 27.67 KG/M2 | HEIGHT: 69 IN | WEIGHT: 186.81 LBS

## 2019-11-20 DIAGNOSIS — N52.9 ERECTILE DYSFUNCTION, UNSPECIFIED ERECTILE DYSFUNCTION TYPE: ICD-10-CM

## 2019-11-20 DIAGNOSIS — E29.1 HYPOGONADISM IN MALE: Primary | ICD-10-CM

## 2019-11-20 DIAGNOSIS — Z12.5 PROSTATE CANCER SCREENING: ICD-10-CM

## 2019-11-20 DIAGNOSIS — D63.8 ANEMIA OF CHRONIC DISEASE: ICD-10-CM

## 2019-11-20 LAB
ALBUMIN SERPL-MCNC: 4.2 G/DL (ref 3.6–5.1)
BILIRUB SERPL-MCNC: NORMAL MG/DL
BLOOD URINE, POC: NORMAL
COLOR, POC UA: YELLOW
GLUCOSE UR QL STRIP: NORMAL
KETONES UR QL STRIP: NORMAL
LEUKOCYTE ESTERASE URINE, POC: NORMAL
NITRITE, POC UA: NORMAL
PH, POC UA: 6
PROTEIN, POC: NORMAL
SHBG SERPL-SCNC: 46 NMOL/L (ref 22–77)
SPECIFIC GRAVITY, POC UA: 1.01
TESTOST FREE SERPL-MCNC: 73 PG/ML (ref 6–73)
TESTOST SERPL-MCNC: 677 NG/DL (ref 250–1100)
TESTOSTERONE.FREE+WB SERPL-MCNC: 140.7 NG/DL (ref 15–150)
UROBILINOGEN, POC UA: NORMAL

## 2019-11-20 PROCEDURE — 1159F MED LIST DOCD IN RCRD: CPT | Mod: ,,, | Performed by: UROLOGY

## 2019-11-20 PROCEDURE — 99999 PR PBB SHADOW E&M-EST. PATIENT-LVL IV: ICD-10-PCS | Mod: PBBFAC,,, | Performed by: UROLOGY

## 2019-11-20 PROCEDURE — 99214 PR OFFICE/OUTPT VISIT, EST, LEVL IV, 30-39 MIN: ICD-10-PCS | Mod: S$PBB,,, | Performed by: UROLOGY

## 2019-11-20 PROCEDURE — 99214 OFFICE O/P EST MOD 30 MIN: CPT | Mod: PBBFAC | Performed by: UROLOGY

## 2019-11-20 PROCEDURE — 99999 PR PBB SHADOW E&M-EST. PATIENT-LVL IV: CPT | Mod: PBBFAC,,, | Performed by: UROLOGY

## 2019-11-20 PROCEDURE — 81002 URINALYSIS NONAUTO W/O SCOPE: CPT | Mod: PBBFAC | Performed by: UROLOGY

## 2019-11-20 PROCEDURE — 1159F PR MEDICATION LIST DOCUMENTED IN MEDICAL RECORD: ICD-10-PCS | Mod: ,,, | Performed by: UROLOGY

## 2019-11-20 PROCEDURE — 99214 OFFICE O/P EST MOD 30 MIN: CPT | Mod: S$PBB,,, | Performed by: UROLOGY

## 2019-11-20 RX ORDER — PAPAVERINE HYDROCHLORIDE 30 MG/ML
INJECTION INTRAMUSCULAR; INTRAVENOUS
Qty: 10 ML | Refills: 5 | Status: SHIPPED | OUTPATIENT
Start: 2019-11-20 | End: 2020-05-20 | Stop reason: SDUPTHER

## 2019-11-20 RX ORDER — TESTOSTERONE GEL, 1% 10 MG/G
5 GEL TRANSDERMAL DAILY
Qty: 30 PACKET | Refills: 5 | Status: SHIPPED | OUTPATIENT
Start: 2019-11-20 | End: 2020-05-20 | Stop reason: SDUPTHER

## 2019-11-20 NOTE — PROGRESS NOTES
Chief Complaint: Prostate Cancer Screening    HPI:   11/20/19: Labs approp, [facility] and the recheck labs look good.  Not using the trimix for ED.  5/16/19: Trimix rx worked okay.  Free T is low, discussed therapy.    3/20/19: Back for a checkup.  Never sucessfully adopted the trimix.  Asks about T but doesn't want to check on it.  Reviewed history in detail.  9/9/16: Went to [facility] and started their trimix and it worked some but not great.  He hasn't followed up.  Had some outside labs from 6/16 that shows T of 398. Backed off the caffeine and started the cialis daily and his LUTS are better.  Satisfied overall.  Would like to try trimix here.  9/9/15: 69 y.o. man referred for prostate cancer screening.  Had seen Dr. Irene for many years but wants to change because of scheduling problems there. No abd/pelvic pain and no exac/rel factors.  No urolithiasis. No gross hematuria. No family history of prostate cancer.  Nocturia x0, but daytime frequency.  Stream is is okay.  Has been going on for a long time.  Drinks 3 cups of coffee mainly in the morning but also in the afternoon and evening. Took flomax in the past but didn't like the retrograde ejaculation.  ED with soft erections that don't last long enough.  Takes the hydrocodone 2-3 times a day.  Took a sample of cialis daily and it helped his ED some. Would have intercourse a couple times a week if all was well.  Says he had a high PSA on one test and low on a recheck, never a biopsy.    Allergies:  Patient has no known allergies.    Medications:  has a current medication list which includes the following prescription(s): albuterol, aspirin, atorvastatin, berb hall/herbal complex no.18, cetirizine, cilostazol, cinnamon bark/chromium/ala, eszopiclone, ferrous gluconate, furosemide, gabapentin, glipizide-metformin, glucosamine/chondr hall a sod, hydrocodone-acetaminophen , krill-om-3-dha-epa-phospho-ast, losartan, min17/nettle/pumpkin/saw  palme, magnesium, metoprolol succinate, multivitamin, mupirocin, naproxen, naproxen sodium, papaverine, resveratrol, sitagliptin, tadalafil, testosterone, triamcinolone acetonide 0.1%, UNABLE TO FIND, UNABLE TO FIND, vasculera, vitamin d, and vitamin e.    Review of Systems:  General: No fever, chills, fatigability, or weight loss.  Skin: No rashes, itching, or changes in color or texture of skin.  Chest: Denies DISLA, cyanosis, wheezing, cough, and sputum production.  Abdomen: Appetite fine. No weight loss. Denies diarrhea, abdominal pain, hematemesis, or blood in stool.  Musculoskeletal: No joint stiffness or swelling. Denies back pain.  : As above.  All other review of systems negative.    PMH:   has a past medical history of Diabetes mellitus, type 2 and Hyperlipidemia.    PSH:   has a past surgical history that includes Aortic valve replacement (03/09/2015) and Knee surgery (Bilateral, 2013).    FamHx: family history is not on file.    SocHx:  reports that he has been smoking. He does not have any smokeless tobacco history on file. He reports that he drinks alcohol. He reports that he does not use drugs.      Physical Exam:  Vitals:    11/20/19 1407   BP: 114/76     General: A&Ox3, no apparent distress, no deformities  Neck: No masses, normal thyroid  Lungs: normal inspiration, no use of accessory muscles  Heart: normal pulse, no arrhythmias  Abdomen: Soft, NT, ND  Skin: The skin is warm and dry. No jaundice.  Ext: No c/c/e.  :   3/19: Test desc roxy, no abnormalities of epididymus. Penis normal, with normal penile and scrotal skin. Meatus normal. Normal rectal tone, no hemorrhoids. Prost 30 gm no nodules or masses appreciated. SV not palpable. Perineum and anus normal.    Labs/Studies: Urinalysis performed in clinic, summary: UA normal  PSA    3/19: 0.88    Impression/Plan:   1. Labs/PSA 6 mo low risk of prostate cancer  2. Continue trimix for ED if he wants but not using it lately.  Refilled.  Advised to  titrate up the dose a bit.  3. T gel and RTC 6 mo with labs.  4. Anemic; discussed

## 2020-05-14 ENCOUNTER — LAB VISIT (OUTPATIENT)
Dept: LAB | Facility: HOSPITAL | Age: 70
End: 2020-05-14
Attending: UROLOGY
Payer: MEDICARE

## 2020-05-14 DIAGNOSIS — N52.9 ERECTILE DYSFUNCTION, UNSPECIFIED ERECTILE DYSFUNCTION TYPE: ICD-10-CM

## 2020-05-14 DIAGNOSIS — D63.8 ANEMIA OF CHRONIC DISEASE: ICD-10-CM

## 2020-05-14 DIAGNOSIS — E29.1 HYPOGONADISM IN MALE: ICD-10-CM

## 2020-05-14 DIAGNOSIS — Z12.5 PROSTATE CANCER SCREENING: ICD-10-CM

## 2020-05-14 LAB
ALBUMIN SERPL BCP-MCNC: 4.2 G/DL (ref 3.5–5.2)
ALP SERPL-CCNC: 92 U/L (ref 55–135)
ALT SERPL W/O P-5'-P-CCNC: 28 U/L (ref 10–44)
AST SERPL-CCNC: 22 U/L (ref 10–40)
BILIRUB DIRECT SERPL-MCNC: 0.3 MG/DL (ref 0.1–0.3)
BILIRUB SERPL-MCNC: 0.5 MG/DL (ref 0.1–1)
COMPLEXED PSA SERPL-MCNC: 1.1 NG/ML (ref 0–4)
HCT VFR BLD AUTO: 44.9 % (ref 40–54)
PROT SERPL-MCNC: 7.3 G/DL (ref 6–8.4)

## 2020-05-14 PROCEDURE — 82040 ASSAY OF SERUM ALBUMIN: CPT

## 2020-05-14 PROCEDURE — 80076 HEPATIC FUNCTION PANEL: CPT

## 2020-05-14 PROCEDURE — 84153 ASSAY OF PSA TOTAL: CPT

## 2020-05-14 PROCEDURE — 85014 HEMATOCRIT: CPT

## 2020-05-14 PROCEDURE — 36415 COLL VENOUS BLD VENIPUNCTURE: CPT

## 2020-05-20 ENCOUNTER — OFFICE VISIT (OUTPATIENT)
Dept: UROLOGY | Facility: CLINIC | Age: 70
End: 2020-05-20
Payer: MEDICARE

## 2020-05-20 VITALS
WEIGHT: 182.63 LBS | SYSTOLIC BLOOD PRESSURE: 140 MMHG | DIASTOLIC BLOOD PRESSURE: 72 MMHG | TEMPERATURE: 99 F | BODY MASS INDEX: 27.37 KG/M2

## 2020-05-20 DIAGNOSIS — E29.1 HYPOGONADISM IN MALE: ICD-10-CM

## 2020-05-20 DIAGNOSIS — N52.9 ERECTILE DYSFUNCTION, UNSPECIFIED ERECTILE DYSFUNCTION TYPE: ICD-10-CM

## 2020-05-20 DIAGNOSIS — Z12.5 PROSTATE CANCER SCREENING: Primary | ICD-10-CM

## 2020-05-20 DIAGNOSIS — I10 HYPERTENSION, UNSPECIFIED TYPE: ICD-10-CM

## 2020-05-20 LAB
BILIRUB SERPL-MCNC: NORMAL MG/DL
BLOOD URINE, POC: NORMAL
COLOR, POC UA: YELLOW
GLUCOSE UR QL STRIP: 50
KETONES UR QL STRIP: NORMAL
LEUKOCYTE ESTERASE URINE, POC: NORMAL
NITRITE, POC UA: NORMAL
PH, POC UA: 5
PROTEIN, POC: 30
SPECIFIC GRAVITY, POC UA: 1.02
UROBILINOGEN, POC UA: NORMAL

## 2020-05-20 PROCEDURE — 99214 PR OFFICE/OUTPT VISIT, EST, LEVL IV, 30-39 MIN: ICD-10-PCS | Mod: S$PBB,,, | Performed by: UROLOGY

## 2020-05-20 PROCEDURE — 99214 OFFICE O/P EST MOD 30 MIN: CPT | Mod: S$PBB,,, | Performed by: UROLOGY

## 2020-05-20 PROCEDURE — 99999 PR PBB SHADOW E&M-EST. PATIENT-LVL IV: ICD-10-PCS | Mod: PBBFAC,,, | Performed by: UROLOGY

## 2020-05-20 PROCEDURE — 99999 PR PBB SHADOW E&M-EST. PATIENT-LVL IV: CPT | Mod: PBBFAC,,, | Performed by: UROLOGY

## 2020-05-20 PROCEDURE — 81002 URINALYSIS NONAUTO W/O SCOPE: CPT | Mod: PBBFAC | Performed by: UROLOGY

## 2020-05-20 PROCEDURE — 99214 OFFICE O/P EST MOD 30 MIN: CPT | Mod: PBBFAC | Performed by: UROLOGY

## 2020-05-20 RX ORDER — TADALAFIL 20 MG/1
20 TABLET ORAL DAILY
Qty: 5 TABLET | Refills: 11 | Status: SHIPPED | OUTPATIENT
Start: 2020-05-20 | End: 2021-05-20

## 2020-05-20 RX ORDER — TESTOSTERONE GEL, 1% 10 MG/G
5 GEL TRANSDERMAL DAILY
Qty: 30 PACKET | Refills: 5 | Status: SHIPPED | OUTPATIENT
Start: 2020-05-20 | End: 2022-05-31

## 2020-05-20 RX ORDER — PAPAVERINE HYDROCHLORIDE 30 MG/ML
INJECTION INTRAMUSCULAR; INTRAVENOUS
Qty: 10 ML | Refills: 5 | Status: SHIPPED | OUTPATIENT
Start: 2020-05-20 | End: 2022-05-31

## 2020-05-20 NOTE — PROGRESS NOTES
Chief Complaint: Prostate Cancer Screening    HPI:   5/20/20: T gel going well.  PSA about the same.  ED stable.  Reviewed history in detail.   11/20/19: Labs approp, [facility] and [facility]ck labs look good.  Not using the trimix for ED.  5/16/19: Trimix rx worked okay.  Free T is low, discussed therapy.    3/20/19: Back for a checkup.  Never sucessfully adopted the trimix.  Asks about T but doesn't want to check on it.  Reviewed history in detail.  9/9/16: Went to [facility] and started their trimix and it worked some but not great.  He hasn't followed up.  Had some outside labs from 6/16 that shows T of 398. Backed off the caffeine and started the cialis daily and his LUTS are better.  Satisfied overall.  Would like to try trimix here.  9/9/15: 69 y.o. man referred for prostate cancer screening.  Had seen Dr. Irene for many years but wants to change because of scheduling problems there. No abd/pelvic pain and no exac/rel factors.  No urolithiasis. No gross hematuria. No family history of prostate cancer.  Nocturia x0, but daytime frequency.  Stream is is okay.  Has been going on for a long time.  Drinks 3 cups of coffee mainly in the morning but also in the afternoon and evening. Took flomax in the past but didn't like the retrograde ejaculation.  ED with soft erections that don't last long enough.  Takes the hydrocodone 2-3 times a day.  Took a sample of cialis daily and it helped his ED some. Would have intercourse a couple times a week if all was well.  Says he had a high PSA on one test and low on a recheck, never a biopsy.    Allergies:  Patient has no known allergies.    Medications:  has a current medication list which includes the following prescription(s): albuterol, aspirin, cinnamon bark/chromium/ala, eszopiclone, ferrous gluconate, furosemide, glipizide-metformin, hydrocodone-acetaminophen , krill-om-3-dha-epa-phospho-ast, magnesium, multivitamin, mupirocin, naproxen,  naproxen sodium, papaverine, resveratrol, rivaroxaban, sitagliptin, testosterone, triamcinolone acetonide 0.1%, UNABLE TO FIND, UNABLE TO FIND, vitamin d, vitamin e, atorvastatin, berb hall/herbal complex no.18, cetirizine, cilostazol, gabapentin, glucosamine/chondr hall a sod, losartan, min17/nettle/pumpkin/saw palme, metoprolol succinate, tadalafil, and vasculera.    Review of Systems:  General: No fever, chills, fatigability, or weight loss.  Skin: No rashes, itching, or changes in color or texture of skin.  Chest: Denies DISLA, cyanosis, wheezing, cough, and sputum production.  Abdomen: Appetite fine. No weight loss. Denies diarrhea, abdominal pain, hematemesis, or blood in stool.  Musculoskeletal: No joint stiffness or swelling. Denies back pain.  : As above.  All other review of systems negative.    PMH:   has a past medical history of Diabetes mellitus, type 2 and Hyperlipidemia.    PSH:   has a past surgical history that includes Aortic valve replacement (03/09/2015) and Knee surgery (Bilateral, 2013).    FamHx: family history is not on file.    SocHx:  reports that he has been smoking. He does not have any smokeless tobacco history on file. He reports that he drinks alcohol. He reports that he does not use drugs.      Physical Exam:  Vitals:    05/20/20 1413   BP: (!) 140/72   Temp: 98.8 °F (37.1 °C)     General: A&Ox3, no apparent distress, no deformities  Neck: No masses, normal thyroid  Lungs: normal inspiration, no use of accessory muscles  Heart: normal pulse, no arrhythmias  Abdomen: Soft, NT, ND  Skin: The skin is warm and dry. No jaundice.  Ext: No c/c/e.  :   3/19: Test desc roxy, no abnormalities of epididymus. Penis normal, with normal penile and scrotal skin. Meatus normal. Normal rectal tone, no hemorrhoids. Prost 30 gm no nodules or masses appreciated. SV not palpable. Perineum and anus normal.    Labs/Studies: Urinalysis performed in clinic, summary: UA normal  PSA    3/19: 0.88    5/20:  1.1    Impression/Plan:   1. Labs/PSA 12 mo low risk of prostate cancer  2. Continue trimix for ED if he wants but not using it lately.  Refilled.  Advised to titrate up the dose a bit.  3. T gel and RTC 12 mo with labs.  4. HTN: discussed, taking his meds

## 2020-05-21 LAB
ALBUMIN SERPL-MCNC: 4.3 G/DL (ref 3.6–5.1)
SHBG SERPL-SCNC: 57 NMOL/L (ref 22–77)
TESTOST FREE SERPL-MCNC: 75.6 PG/ML (ref 6–73)
TESTOST SERPL-MCNC: 832 NG/DL (ref 250–1100)
TESTOSTERONE.FREE+WB SERPL-MCNC: 148.9 NG/DL (ref 15–150)

## 2020-08-28 ENCOUNTER — OFFICE VISIT (OUTPATIENT)
Dept: URGENT CARE | Facility: CLINIC | Age: 70
End: 2020-08-28
Payer: MEDICARE

## 2020-08-28 VITALS
SYSTOLIC BLOOD PRESSURE: 166 MMHG | WEIGHT: 182 LBS | HEIGHT: 69 IN | RESPIRATION RATE: 16 BRPM | BODY MASS INDEX: 26.96 KG/M2 | TEMPERATURE: 98 F | HEART RATE: 70 BPM | DIASTOLIC BLOOD PRESSURE: 71 MMHG | OXYGEN SATURATION: 95 %

## 2020-08-28 DIAGNOSIS — R09.82 ALLERGIC RHINITIS WITH POSTNASAL DRIP: ICD-10-CM

## 2020-08-28 DIAGNOSIS — J30.9 ALLERGIC RHINITIS WITH POSTNASAL DRIP: ICD-10-CM

## 2020-08-28 DIAGNOSIS — J02.9 PHARYNGITIS, UNSPECIFIED ETIOLOGY: Primary | ICD-10-CM

## 2020-08-28 DIAGNOSIS — R06.02 SHORTNESS OF BREATH: ICD-10-CM

## 2020-08-28 PROCEDURE — 99214 OFFICE O/P EST MOD 30 MIN: CPT | Mod: S$GLB,,, | Performed by: NURSE PRACTITIONER

## 2020-08-28 PROCEDURE — 99214 PR OFFICE/OUTPT VISIT, EST, LEVL IV, 30-39 MIN: ICD-10-PCS | Mod: S$GLB,,, | Performed by: NURSE PRACTITIONER

## 2020-08-28 RX ORDER — ALBUTEROL SULFATE 90 UG/1
1-2 AEROSOL, METERED RESPIRATORY (INHALATION) EVERY 4 HOURS PRN
Qty: 18 G | Refills: 0 | Status: SHIPPED | OUTPATIENT
Start: 2020-08-28 | End: 2020-09-27

## 2020-08-28 NOTE — PROGRESS NOTES
"Subjective:       Patient ID: Richard Renae is a 70 y.o. male.    Vitals:  height is 5' 8.5" (1.74 m) and weight is 82.6 kg (182 lb). His temperature is 97.9 °F (36.6 °C). His blood pressure is 166/71 (abnormal) and his pulse is 70. His respiration is 16 and oxygen saturation is 95%.     Chief Complaint: Sore Throat    Pt admits having flonase and Claritin at home but doesn't use it. States it feels like he needs "hock something up and spit it out".    Sore Throat   This is a new problem. The current episode started 1 to 4 weeks ago. The problem has been unchanged. Neither side of throat is experiencing more pain than the other. There has been no fever. The pain is at a severity of 2/10. The pain is mild. Associated symptoms include diarrhea, shortness of breath, swollen glands and trouble swallowing. Pertinent negatives include no abdominal pain, congestion, coughing, drooling, ear discharge, ear pain, headaches, hoarse voice, plugged ear sensation, neck pain, stridor or vomiting. Associated symptoms comments: sneezing. He has had no exposure to strep or mono. He has tried nothing for the symptoms. The treatment provided no relief.       Constitution: Negative for chills, sweating, fatigue and fever.   HENT: Positive for postnasal drip, sore throat and trouble swallowing. Negative for ear pain, ear discharge, drooling, congestion, sinus pain, sinus pressure and voice change.    Neck: Negative for neck pain and painful lymph nodes.   Eyes: Negative for eye redness.   Respiratory: Positive for shortness of breath. Negative for chest tightness, cough, sputum production, bloody sputum, COPD, stridor, wheezing and asthma.    Gastrointestinal: Positive for diarrhea. Negative for abdominal pain, nausea and vomiting.   Musculoskeletal: Negative for muscle ache.   Skin: Negative for rash.   Allergic/Immunologic: Negative for seasonal allergies and asthma.   Neurological: Negative for headaches.   Hematologic/Lymphatic: " Negative for swollen lymph nodes.       Objective:      Physical Exam   Constitutional: He is oriented to person, place, and time. He appears well-developed. He is cooperative.  Non-toxic appearance. He does not appear ill. No distress.   HENT:   Head: Normocephalic and atraumatic.   Ears:   Right Ear: Hearing, external ear and ear canal normal. Tympanic membrane is not erythematous. A middle ear effusion is present.   Left Ear: Hearing, external ear and ear canal normal. Tympanic membrane is not erythematous. A middle ear effusion is present.   Nose: Rhinorrhea present. No mucosal edema or nasal deformity. No epistaxis. Right sinus exhibits no maxillary sinus tenderness and no frontal sinus tenderness. Left sinus exhibits no maxillary sinus tenderness and no frontal sinus tenderness.   Mouth/Throat: Uvula is midline, oropharynx is clear and moist and mucous membranes are normal. No trismus in the jaw. Normal dentition. No uvula swelling. Cobblestoning present. No oropharyngeal exudate, posterior oropharyngeal edema or posterior oropharyngeal erythema.   Eyes: Conjunctivae and lids are normal. No scleral icterus.   Neck: Trachea normal, full passive range of motion without pain and phonation normal. Neck supple. No neck rigidity. No edema and no erythema present.   Cardiovascular: Normal rate, regular rhythm and normal heart sounds.   Pulmonary/Chest: Effort normal and breath sounds normal. No respiratory distress. He has no decreased breath sounds. He has no rhonchi.   Abdominal: Normal appearance.   Musculoskeletal: Normal range of motion.         General: No deformity.   Neurological: He is alert and oriented to person, place, and time. He exhibits normal muscle tone. Coordination normal.   Skin: Skin is warm, dry, intact, not diaphoretic and not pale. Psychiatric: His speech is normal and behavior is normal. Judgment and thought content normal.   Nursing note and vitals reviewed.        Assessment:       1.  Pharyngitis, unspecified etiology    2. Allergic rhinitis with postnasal drip    3. Shortness of breath        Plan:         Pharyngitis, unspecified etiology    Allergic rhinitis with postnasal drip    Shortness of breath  -     albuterol (PROVENTIL/VENTOLIN HFA) 90 mcg/actuation inhaler; Inhale 1-2 puffs into the lungs every 4 (four) hours as needed.  Dispense: 18 g; Refill: 0         · You may take Tylenol or Ibuprofen as needed for fever, throat pain, or body aches.   · Take an over the counter 24 hour antihistamine such as Claritin or Zyrtec for postnasal drip and other allergy symptoms.  · Use flonase daily for inflammation of nasal passages.  · Cool air humidifier to help moisten throat and nasal passages.  · Avoid cigarette smoke.  · For sore throat, gargling with warm salt water, Cepacol throat lozenges, or Chloraseptic spray may help with pain.  · Use inhaler as prescribed for shortness of breath.  · Follow up with your PCP if symptoms don't improve.  · Please go to the ER for any worsening in your condition including: hives, rash, increased pain or swelling to throat, persistent fever that does not improve with Tylenol/Motrin use, dark urine, severe headache, vision changes, neck stiffness, lethargy, or for any other new or concerning symptoms.

## 2020-08-28 NOTE — PATIENT INSTRUCTIONS
· You may take Tylenol or Ibuprofen as needed for fever, throat pain, or body aches.   · Take an over the counter 24 hour antihistamine such as Claritin or Zyrtec for postnasal drip and other allergy symptoms.  · Use flonase daily for inflammation of nasal passages.  · Cool air humidifier to help moisten throat and nasal passages.  · Avoid cigarette smoke.  · For sore throat, gargling with warm salt water, Cepacol throat lozenges, or Chloraseptic spray may help with pain.  · Use inhaler as prescribed for shortness of breath.  · Follow up with your PCP if symptoms don't improve.  · Please go to the ER for any worsening in your condition including: hives, rash, increased pain or swelling to throat, persistent fever that does not improve with Tylenol/Motrin use, dark urine, severe headache, vision changes, neck stiffness, lethargy, or for any other new or concerning symptoms.

## 2020-08-30 ENCOUNTER — TELEPHONE (OUTPATIENT)
Dept: URGENT CARE | Facility: CLINIC | Age: 70
End: 2020-08-30

## 2021-04-15 ENCOUNTER — TELEPHONE (OUTPATIENT)
Dept: UROLOGY | Facility: CLINIC | Age: 71
End: 2021-04-15

## 2021-05-27 ENCOUNTER — OFFICE VISIT (OUTPATIENT)
Dept: UROLOGY | Facility: CLINIC | Age: 71
End: 2021-05-27
Payer: MEDICARE

## 2021-05-27 ENCOUNTER — LAB VISIT (OUTPATIENT)
Dept: LAB | Facility: HOSPITAL | Age: 71
End: 2021-05-27
Attending: UROLOGY
Payer: MEDICARE

## 2021-05-27 VITALS
DIASTOLIC BLOOD PRESSURE: 80 MMHG | BODY MASS INDEX: 26.66 KG/M2 | WEIGHT: 177.94 LBS | SYSTOLIC BLOOD PRESSURE: 130 MMHG

## 2021-05-27 DIAGNOSIS — I50.9 CONGESTIVE HEART FAILURE, UNSPECIFIED HF CHRONICITY, UNSPECIFIED HEART FAILURE TYPE: ICD-10-CM

## 2021-05-27 DIAGNOSIS — E29.1 HYPOGONADISM IN MALE: Primary | ICD-10-CM

## 2021-05-27 DIAGNOSIS — E11.69 TYPE 2 DIABETES MELLITUS WITH OTHER SPECIFIED COMPLICATION, UNSPECIFIED WHETHER LONG TERM INSULIN USE: ICD-10-CM

## 2021-05-27 DIAGNOSIS — E78.5 HYPERLIPIDEMIA, UNSPECIFIED HYPERLIPIDEMIA TYPE: ICD-10-CM

## 2021-05-27 DIAGNOSIS — N40.1 BPH WITH OBSTRUCTION/LOWER URINARY TRACT SYMPTOMS: ICD-10-CM

## 2021-05-27 DIAGNOSIS — E29.1 HYPOGONADISM IN MALE: ICD-10-CM

## 2021-05-27 DIAGNOSIS — N13.8 BPH WITH OBSTRUCTION/LOWER URINARY TRACT SYMPTOMS: ICD-10-CM

## 2021-05-27 PROCEDURE — 83036 HEMOGLOBIN GLYCOSYLATED A1C: CPT | Performed by: UROLOGY

## 2021-05-27 PROCEDURE — 99213 OFFICE O/P EST LOW 20 MIN: CPT | Mod: PBBFAC,PO | Performed by: UROLOGY

## 2021-05-27 PROCEDURE — 80053 COMPREHEN METABOLIC PANEL: CPT | Performed by: UROLOGY

## 2021-05-27 PROCEDURE — 99999 PR PBB SHADOW E&M-EST. PATIENT-LVL III: ICD-10-PCS | Mod: PBBFAC,,, | Performed by: UROLOGY

## 2021-05-27 PROCEDURE — 99999 PR PBB SHADOW E&M-EST. PATIENT-LVL III: CPT | Mod: PBBFAC,,, | Performed by: UROLOGY

## 2021-05-27 PROCEDURE — 84153 ASSAY OF PSA TOTAL: CPT | Performed by: UROLOGY

## 2021-05-27 PROCEDURE — 80061 LIPID PANEL: CPT | Performed by: UROLOGY

## 2021-05-27 PROCEDURE — 84403 ASSAY OF TOTAL TESTOSTERONE: CPT | Performed by: UROLOGY

## 2021-05-27 PROCEDURE — 36415 COLL VENOUS BLD VENIPUNCTURE: CPT | Mod: PO | Performed by: UROLOGY

## 2021-05-27 PROCEDURE — 85027 COMPLETE CBC AUTOMATED: CPT | Performed by: UROLOGY

## 2021-05-27 PROCEDURE — 99213 PR OFFICE/OUTPT VISIT, EST, LEVL III, 20-29 MIN: ICD-10-PCS | Mod: S$PBB,,, | Performed by: UROLOGY

## 2021-05-27 PROCEDURE — 99213 OFFICE O/P EST LOW 20 MIN: CPT | Mod: S$PBB,,, | Performed by: UROLOGY

## 2021-05-27 RX ORDER — TADALAFIL 20 MG/1
20 TABLET ORAL DAILY
Qty: 30 TABLET | Refills: 11 | Status: SHIPPED | OUTPATIENT
Start: 2021-05-27 | End: 2022-05-31 | Stop reason: SDUPTHER

## 2021-05-28 LAB
ALBUMIN SERPL BCP-MCNC: 3.9 G/DL (ref 3.5–5.2)
ALP SERPL-CCNC: 89 U/L (ref 55–135)
ALT SERPL W/O P-5'-P-CCNC: 18 U/L (ref 10–44)
ANION GAP SERPL CALC-SCNC: 13 MMOL/L (ref 8–16)
AST SERPL-CCNC: 17 U/L (ref 10–40)
BILIRUB SERPL-MCNC: 0.5 MG/DL (ref 0.1–1)
BUN SERPL-MCNC: 15 MG/DL (ref 8–23)
CALCIUM SERPL-MCNC: 9.8 MG/DL (ref 8.7–10.5)
CHLORIDE SERPL-SCNC: 102 MMOL/L (ref 95–110)
CHOLEST SERPL-MCNC: 98 MG/DL (ref 120–199)
CHOLEST/HDLC SERPL: 2.7 {RATIO} (ref 2–5)
CO2 SERPL-SCNC: 28 MMOL/L (ref 23–29)
COMPLEXED PSA SERPL-MCNC: 2.1 NG/ML (ref 0–4)
CREAT SERPL-MCNC: 0.8 MG/DL (ref 0.5–1.4)
ERYTHROCYTE [DISTWIDTH] IN BLOOD BY AUTOMATED COUNT: 16.2 % (ref 11.5–14.5)
EST. GFR  (AFRICAN AMERICAN): >60 ML/MIN/1.73 M^2
EST. GFR  (NON AFRICAN AMERICAN): >60 ML/MIN/1.73 M^2
ESTIMATED AVG GLUCOSE: 137 MG/DL (ref 68–131)
GLUCOSE SERPL-MCNC: 189 MG/DL (ref 70–110)
HBA1C MFR BLD: 6.4 % (ref 4–5.6)
HCT VFR BLD AUTO: 39.3 % (ref 40–54)
HDLC SERPL-MCNC: 36 MG/DL (ref 40–75)
HDLC SERPL: 36.7 % (ref 20–50)
HGB BLD-MCNC: 11.7 G/DL (ref 14–18)
LDLC SERPL CALC-MCNC: 52 MG/DL (ref 63–159)
MCH RBC QN AUTO: 28.5 PG (ref 27–31)
MCHC RBC AUTO-ENTMCNC: 29.8 G/DL (ref 32–36)
MCV RBC AUTO: 96 FL (ref 82–98)
NONHDLC SERPL-MCNC: 62 MG/DL
PLATELET # BLD AUTO: 181 K/UL (ref 150–450)
PMV BLD AUTO: 10 FL (ref 9.2–12.9)
POTASSIUM SERPL-SCNC: 4 MMOL/L (ref 3.5–5.1)
PROT SERPL-MCNC: 7.4 G/DL (ref 6–8.4)
RBC # BLD AUTO: 4.1 M/UL (ref 4.6–6.2)
SODIUM SERPL-SCNC: 143 MMOL/L (ref 136–145)
TESTOST SERPL-MCNC: 788 NG/DL (ref 304–1227)
TRIGL SERPL-MCNC: 50 MG/DL (ref 30–150)
WBC # BLD AUTO: 5.98 K/UL (ref 3.9–12.7)

## 2021-08-04 ENCOUNTER — TELEPHONE (OUTPATIENT)
Dept: UROLOGY | Facility: CLINIC | Age: 71
End: 2021-08-04

## 2021-08-05 ENCOUNTER — TELEPHONE (OUTPATIENT)
Dept: UROLOGY | Facility: CLINIC | Age: 71
End: 2021-08-05

## 2022-05-31 ENCOUNTER — LAB VISIT (OUTPATIENT)
Dept: LAB | Facility: HOSPITAL | Age: 72
End: 2022-05-31
Attending: UROLOGY
Payer: MEDICARE

## 2022-05-31 ENCOUNTER — OFFICE VISIT (OUTPATIENT)
Dept: UROLOGY | Facility: CLINIC | Age: 72
End: 2022-05-31
Payer: MEDICARE

## 2022-05-31 VITALS
WEIGHT: 170 LBS | DIASTOLIC BLOOD PRESSURE: 64 MMHG | SYSTOLIC BLOOD PRESSURE: 118 MMHG | HEIGHT: 69 IN | BODY MASS INDEX: 25.18 KG/M2

## 2022-05-31 DIAGNOSIS — Z12.5 PROSTATE CANCER SCREENING: ICD-10-CM

## 2022-05-31 DIAGNOSIS — N40.1 BPH WITH OBSTRUCTION/LOWER URINARY TRACT SYMPTOMS: Primary | ICD-10-CM

## 2022-05-31 DIAGNOSIS — N13.8 BPH WITH OBSTRUCTION/LOWER URINARY TRACT SYMPTOMS: Primary | ICD-10-CM

## 2022-05-31 DIAGNOSIS — N52.9 ERECTILE DYSFUNCTION, UNSPECIFIED ERECTILE DYSFUNCTION TYPE: ICD-10-CM

## 2022-05-31 PROCEDURE — 99999 PR PBB SHADOW E&M-EST. PATIENT-LVL III: CPT | Mod: PBBFAC,,, | Performed by: UROLOGY

## 2022-05-31 PROCEDURE — 84153 ASSAY OF PSA TOTAL: CPT | Performed by: UROLOGY

## 2022-05-31 PROCEDURE — 99213 OFFICE O/P EST LOW 20 MIN: CPT | Mod: S$PBB,,, | Performed by: UROLOGY

## 2022-05-31 PROCEDURE — 99213 OFFICE O/P EST LOW 20 MIN: CPT | Mod: PBBFAC,PO | Performed by: UROLOGY

## 2022-05-31 PROCEDURE — 36415 COLL VENOUS BLD VENIPUNCTURE: CPT | Mod: PO | Performed by: UROLOGY

## 2022-05-31 PROCEDURE — 99999 PR PBB SHADOW E&M-EST. PATIENT-LVL III: ICD-10-PCS | Mod: PBBFAC,,, | Performed by: UROLOGY

## 2022-05-31 PROCEDURE — 99213 PR OFFICE/OUTPT VISIT, EST, LEVL III, 20-29 MIN: ICD-10-PCS | Mod: S$PBB,,, | Performed by: UROLOGY

## 2022-05-31 RX ORDER — TADALAFIL 20 MG/1
20 TABLET ORAL DAILY PRN
Qty: 30 TABLET | Refills: 11 | Status: SHIPPED | OUTPATIENT
Start: 2022-05-31 | End: 2023-05-31 | Stop reason: SDUPTHER

## 2022-05-31 NOTE — PROGRESS NOTES
Chief Complaint   Patient presents with    Other     1 yr f/u         History of Present Illness:   Richard Renae is a 71 y.o. male here for follow up.     5/31/22-Pt reports that he was hospitalized a few months ago for iron deficiency anemia and had a blood transfusion. He reports that he has urinary frequency, which he attributes to lasix. Taking cialis prn and works somewhat. No gross hematuria. [facility]     [facility]21-[facility]  States that the testosterone helps with muscle mass. Helps somewhat with energy. He was hospitalized at [facility] with CHF exacerbation. Uses cialis and it works okay. Not using trimix. No bothersome LUTS.     Per Dr. Gunderson:  5/20/20: T gel going well.  PSA about the same.  ED stable.  Reviewed history in detail.   11/20/19: Labs approp, [facility] and the recheck labs look good.  Not using the trimix for ED.  5/16/19: Trimix rx worked okay.  Free T is low, discussed therapy.    3/20/19: Back for a checkup.  Never sucessfully adopted the trimix.  Asks about T but doesn't want to check on it.  Reviewed history in detail.  9/9/16: Went to [facility] and started their trimix and it worked some but not great.  He hasn't followed up.  Had some outside labs from 6/16 that shows T of 398. Backed off the caffeine and started the cialis daily and his LUTS are better.  Satisfied overall.  Would like to try trimix here.  9/9/15: 69 y.o. man referred for prostate cancer screening.  Had seen Dr. Irene for many years but wants to change because of scheduling problems there. No abd/pelvic pain and no exac/rel factors.  No urolithiasis. No gross hematuria. No family history of prostate cancer.  Nocturia x0, but daytime frequency.  Stream is is okay.  Has been going on for a long time.  Drinks 3 cups of coffee mainly in the morning but also in the afternoon and evening. Took flomax in the past but  didn't like the retrograde ejaculation.  ED with soft erections that don't last long enough.  Takes the hydrocodone 2-3 times a day.  Took a sample of cialis daily and it helped his ED some. Would have intercourse a couple times a week if all was well.  Says he had a high PSA on one test and low on a recheck, never a biopsy.    Review of Systems   Constitutional: Positive for fatigue.   HENT: Negative for nosebleeds.    Eyes: Negative for photophobia.   Respiratory: Negative for cough.    Cardiovascular: Negative for chest pain.   Gastrointestinal: Negative for abdominal pain.   Genitourinary: Negative for difficulty urinating.   Musculoskeletal: Negative for gait problem.   Neurological: Negative for speech difficulty.   Psychiatric/Behavioral: Negative for confusion.   All other systems reviewed and are negative.      Current Outpatient Medications   Medication Sig Dispense Refill    albuterol 90 mcg/actuation inhaler Inhale 2 puffs into the lungs every 6 (six) hours as needed for Wheezing. 1 Inhaler 0    atorvastatin (LIPITOR) 40 MG tablet Take 40 mg by mouth once daily.      cetirizine (ZYRTEC) 10 MG tablet Take 1 tablet (10 mg total) by mouth every evening. 30 tablet 0    cilostazol (PLETAL) 50 MG Tab Take 50 mg by mouth 2 (two) times daily.      CINNAMON BARK-CHROMIUM-ALA ORAL Take by mouth.      eszopiclone (LUNESTA) 3 mg Tab Take 3 mg by mouth every evening.      ferrous gluconate (FERGON) 324 MG tablet Take 5,000 mg by mouth.      hydrocodone-acetaminophen  (LORTAB)  mg per tablet Take 1 tablet by mouth every 6 (six) hours as needed for Pain.      losartan (COZAAR) 25 MG tablet Take 25 mg by mouth every evening.      multivitamin (THERAGRAN) per tablet Take 1 tablet by mouth once daily.      mupirocin (BACTROBAN) 2 % ointment Apply topically 3 (three) times daily. 22 g 0    naproxen sodium (ANAPROX) 220 MG tablet Take 220 mg by mouth 2 (two) times daily with meals.      resveratrol  100 mg Cap Take by mouth.      rivaroxaban (XARELTO) 20 mg Tab Take 20 mg by mouth daily with dinner or evening meal.      SITagliptin (JANUVIA) 100 MG Tab Take 100 mg by mouth every evening.      testosterone (ANDROGEL) 1 % (50 mg/5 gram) GlPk Apply 5 g topically once daily. 30 packet 5    triamcinolone acetonide 0.1% (KENALOG) 0.1 % cream       UNABLE TO FIND Take 595 mg by mouth once daily. medication name: POTASSIUM 595MG      vitamin D 1000 units Tab Take 185 mg by mouth once daily.      vitamin E 400 UNIT capsule Take 400 Units by mouth once daily.      furosemide (LASIX) 20 MG tablet TAKE ONE TABLET BY MOUTH ONCE DAILY      gabapentin (NEURONTIN) 300 MG capsule Take 300 mg by mouth 2 (two) times daily.      glipizide-metformin (METAGLIP) 5-500 mg per tablet Take 1 tablet by mouth 2 (two) times daily before meals.      GLUCOSAMINE HCL/CHONDR HAWTHORNE A NA (OSTEO BI-FLEX ORAL) Take by mouth.      M-17/NETTLE/PUMPK/SAW PALMET (PROSTATE THERAPY ORAL) Take by mouth.      magnesium 30 mg Tab Take 400 mg by mouth.      metoprolol succinate (TOPROL-XL) 25 MG 24 hr tablet Take 25 mg by mouth.      naproxen (NAPROSYN) 250 MG tablet Take 250 mg by mouth.      tadalafiL (CIALIS) 20 MG Tab Take 1 tablet (20 mg total) by mouth daily as needed (intercourse). 30 tablet 11    VASCULERA 630 mg Tab        No current facility-administered medications for this visit.       Review of patient's allergies indicates:  No Known Allergies    Past medical, family, and social history reviewed as documented in chart with pertinent positive medical, family, and social history detailed in HPI.    Physical Exam  Vitals:    05/31/22 1547   BP: 118/64       General: Well-developed, well-nourished, in no acute distress  HEENT: Normocephalic, atraumatic, extraocular eye movements in tact  Neck: supple, trachea midline, no cervical or supraclavicular adenopathy  Respirations: Even and unlabored  Rectal: 5/31/22-35gram prostate without  nodules or tenderness. No gross blood.   Extremities: Moves all extremities equally, atraumatic, no clubbing, cyanosis, edema  Skin: warm and dry, no lesions  Psych: normal affect  Neuro: Alert and oriented x 3. Cranial nerves II-XII grossly intact    Urinalysis  Color, UA   Date Value Ref Range Status   05/20/2020 yellow  Final   04/06/2012 YELLOW Yellow Final     Comment:     If formed elements are not present in the microscopic  examination, they are NOT mentioned in the report.     Appearance, UA   Date Value Ref Range Status   04/06/2012 CLEAR Clear Final     pH, UA   Date Value Ref Range Status   05/20/2020 5  Final   04/06/2012 5.5 4.5 - 8.0 Final     Specific Gravity, UA   Date Value Ref Range Status   04/06/2012 >=1.030 (A) 1.005 - 1.030 Final     Spec Grav UA   Date Value Ref Range Status   05/20/2020 1.025  Final     Protein, UA   Date Value Ref Range Status   04/06/2012 Negative Negative mg/dl Final     Glucose, UA   Date Value Ref Range Status   04/06/2012 TRACE (A) Negative mg/dl Final     Occult Blood UA   Date Value Ref Range Status   04/06/2012 Negative Negative Final     Nitrite, UA   Date Value Ref Range Status   05/20/2020 neg  Final   04/06/2012 Negative Negative Final     Leukocytes, UA   Date Value Ref Range Status   04/06/2012 Negative Negative Final       Lab Results   Component Value Date    PSA 1.1 05/14/2020    PSA 0.88 03/20/2019    PSA 0.81 09/09/2016           Assessment:   1. BPH with obstruction/lower urinary tract symptoms     2. Erectile dysfunction, unspecified erectile dysfunction type     3. Prostate cancer screening  PSA, Screening    PSA, Screening         Plan:  BPH with obstruction/lower urinary tract symptoms    Erectile dysfunction, unspecified erectile dysfunction type    Prostate cancer screening  -     PSA, Screening; Future; Expected date: 05/31/2022  -     PSA, Screening; Future; Expected date: 05/31/2023    Other orders  -     tadalafiL (CIALIS) 20 MG Tab; Take 1  tablet (20 mg total) by mouth daily as needed (intercourse).  Dispense: 30 tablet; Refill: 11        Follow up in about 1 year (around 5/31/2023) for labs before visit.

## 2022-06-01 LAB — COMPLEXED PSA SERPL-MCNC: 2.1 NG/ML (ref 0–4)

## 2023-05-31 ENCOUNTER — LAB VISIT (OUTPATIENT)
Dept: LAB | Facility: HOSPITAL | Age: 73
End: 2023-05-31
Attending: UROLOGY
Payer: MEDICARE

## 2023-05-31 ENCOUNTER — OFFICE VISIT (OUTPATIENT)
Dept: UROLOGY | Facility: CLINIC | Age: 73
End: 2023-05-31
Payer: MEDICARE

## 2023-05-31 VITALS
WEIGHT: 179.69 LBS | HEART RATE: 88 BPM | HEIGHT: 68 IN | DIASTOLIC BLOOD PRESSURE: 63 MMHG | SYSTOLIC BLOOD PRESSURE: 117 MMHG | BODY MASS INDEX: 27.23 KG/M2

## 2023-05-31 DIAGNOSIS — N13.8 BPH WITH OBSTRUCTION/LOWER URINARY TRACT SYMPTOMS: ICD-10-CM

## 2023-05-31 DIAGNOSIS — N52.01 ERECTILE DYSFUNCTION DUE TO ARTERIAL INSUFFICIENCY: Primary | ICD-10-CM

## 2023-05-31 DIAGNOSIS — Z12.5 PROSTATE CANCER SCREENING: ICD-10-CM

## 2023-05-31 DIAGNOSIS — N40.1 BPH WITH OBSTRUCTION/LOWER URINARY TRACT SYMPTOMS: ICD-10-CM

## 2023-05-31 LAB — COMPLEXED PSA SERPL-MCNC: 1.6 NG/ML (ref 0–4)

## 2023-05-31 PROCEDURE — 84153 ASSAY OF PSA TOTAL: CPT | Performed by: UROLOGY

## 2023-05-31 PROCEDURE — 99213 OFFICE O/P EST LOW 20 MIN: CPT | Mod: PBBFAC,PN | Performed by: UROLOGY

## 2023-05-31 PROCEDURE — 99999 PR PBB SHADOW E&M-EST. PATIENT-LVL III: CPT | Mod: PBBFAC,,, | Performed by: UROLOGY

## 2023-05-31 PROCEDURE — 99999 PR PBB SHADOW E&M-EST. PATIENT-LVL III: ICD-10-PCS | Mod: PBBFAC,,, | Performed by: UROLOGY

## 2023-05-31 PROCEDURE — 36415 COLL VENOUS BLD VENIPUNCTURE: CPT | Mod: PO | Performed by: UROLOGY

## 2023-05-31 PROCEDURE — 99214 PR OFFICE/OUTPT VISIT, EST, LEVL IV, 30-39 MIN: ICD-10-PCS | Mod: S$PBB,,, | Performed by: UROLOGY

## 2023-05-31 PROCEDURE — 99214 OFFICE O/P EST MOD 30 MIN: CPT | Mod: S$PBB,,, | Performed by: UROLOGY

## 2023-05-31 RX ORDER — TADALAFIL 20 MG/1
20 TABLET ORAL DAILY PRN
Qty: 30 TABLET | Refills: 11 | Status: SHIPPED | OUTPATIENT
Start: 2023-05-31 | End: 2024-05-30

## 2023-05-31 NOTE — PROGRESS NOTES
Chief Complaint   Patient presents with    Follow-up         History of Present Illness:   Richard Renae is a 72 y.o. male here for follow up.     5/31/23-here for yearly checkup. Cialis 20mg prn doesn't work great, but works somewhat. Didn't like [facility]. He takes bid lasix and has urinary frequency and nocturia. No gross hematuria. He does have some intermittency. Not bad enough to want meds. He continues to be on testosterone replacement.     5/31/22-Pt reports that he was hospitalized a few months ago for iron deficiency anemia and had a blood transfusion. He reports that he has urinary frequency, which he attributes to lasix. Taking cialis prn and works somewhat. No gross hematuria. [facility]     [facility]/21-[facility]  States that the testosterone helps with muscle mass. Helps somewhat with energy. He was hospitalized at [facility] with CHF exacerbation. Uses cialis and it works okay. Not using trimix. No bothersome LUTS.     Per Dr. Gunderson:  5/20/20: T gel going well.  PSA about the same.  ED stable.  Reviewed history in detail.   11/20/19: Labs approp, [facility] and the recheck labs look good.  Not using the trimix for ED.  5/16/19: Trimix rx worked okay.  Free T is low, discussed therapy.    3/20/19: Back for a checkup.  Never sucessfully adopted the trimix.  Asks about T but doesn't want to check on it.  Reviewed history in detail.  9/9/16: Went to [facility] and started their trimix and it worked some but not great.  He hasn't followed up.  Had some outside labs from 6/16 that shows T of 398. Backed off the caffeine and started the cialis daily and his LUTS are better.  Satisfied overall.  Would like to try trimix here.  9/9/15: 69 y.o. man referred for prostate cancer screening.  Had seen Dr. Irene for many years but wants to change because of scheduling problems there. No abd/pelvic pain and no exac/rel  factors.  No urolithiasis. No gross hematuria. No family history of prostate cancer.  Nocturia x0, but daytime frequency.  Stream is is okay.  Has been going on for a long time.  Drinks 3 cups of coffee mainly in the morning but also in the afternoon and evening. Took flomax in the past but didn't like the retrograde ejaculation.  ED with soft erections that don't last long enough.  Takes the hydrocodone 2-3 times a day.  Took a sample of cialis daily and it helped his ED some. Would have intercourse a couple times a week if all was well.  Says he had a high PSA on one test and low on a recheck, never a biopsy.    Review of Systems   Constitutional:  Positive for fatigue.   HENT:  Negative for nosebleeds.    Eyes:  Negative for photophobia.   Respiratory:  Negative for cough.    Cardiovascular:  Negative for chest pain.   Gastrointestinal:  Negative for abdominal pain.   Genitourinary:  Negative for difficulty urinating.   Musculoskeletal:  Negative for gait problem.   Neurological:  Negative for speech difficulty.   Psychiatric/Behavioral:  Negative for confusion.    All other systems reviewed and are negative.    Current Outpatient Medications   Medication Sig Dispense Refill    albuterol 90 mcg/actuation inhaler Inhale 2 puffs into the lungs every 6 (six) hours as needed for Wheezing. 1 Inhaler 0    atorvastatin (LIPITOR) 40 MG tablet Take 40 mg by mouth once daily.      CINNAMON BARK-CHROMIUM-ALA ORAL Take by mouth.      eszopiclone (LUNESTA) 3 mg Tab Take 3 mg by mouth every evening.      ferrous gluconate (FERGON) 324 MG tablet Take 5,000 mg by mouth.      hydrocodone-acetaminophen  (LORTAB)  mg per tablet Take 1 tablet by mouth every 6 (six) hours as needed for Pain.      magnesium 30 mg Tab Take 400 mg by mouth.      multivitamin (THERAGRAN) per tablet Take 1 tablet by mouth once daily.      mupirocin (BACTROBAN) 2 % ointment Apply topically 3 (three) times daily. 22 g 0    resveratrol 100 mg Cap  Take by mouth.      rivaroxaban (XARELTO) 20 mg Tab Take 20 mg by mouth daily with dinner or evening meal.      SITagliptin (JANUVIA) 100 MG Tab Take 100 mg by mouth every evening.      triamcinolone acetonide 0.1% (KENALOG) 0.1 % cream       UNABLE TO FIND Take 595 mg by mouth once daily. medication name: POTASSIUM 595MG      vitamin D 1000 units Tab Take 185 mg by mouth once daily.      vitamin E 400 UNIT capsule Take 400 Units by mouth once daily.      cetirizine (ZYRTEC) 10 MG tablet Take 1 tablet (10 mg total) by mouth every evening. 30 tablet 0    cilostazol (PLETAL) 50 MG Tab Take 50 mg by mouth 2 (two) times daily.      furosemide (LASIX) 20 MG tablet TAKE ONE TABLET BY MOUTH ONCE DAILY      gabapentin (NEURONTIN) 300 MG capsule Take 300 mg by mouth 2 (two) times daily.      glipizide-metformin (METAGLIP) 5-500 mg per tablet Take 1 tablet by mouth 2 (two) times daily before meals.      GLUCOSAMINE HCL/CHONDR HAWTHORNE A NA (OSTEO BI-FLEX ORAL) Take by mouth.      losartan (COZAAR) 25 MG tablet Take 25 mg by mouth every evening.      M-17/NETTLE/PUMPK/SAW PALMET (PROSTATE THERAPY ORAL) Take by mouth.      metoprolol succinate (TOPROL-XL) 25 MG 24 hr tablet Take 25 mg by mouth.      naproxen (NAPROSYN) 250 MG tablet Take 250 mg by mouth.      naproxen sodium (ANAPROX) 220 MG tablet Take 220 mg by mouth 2 (two) times daily with meals.      tadalafiL (CIALIS) 20 MG Tab Take 1 tablet (20 mg total) by mouth daily as needed (intercourse). 30 tablet 11    testosterone (ANDROGEL) 1 % (50 mg/5 gram) GlPk Apply 5 g topically once daily. 30 packet 5    VASCULERA 630 mg Tab        No current facility-administered medications for this visit.       Review of patient's allergies indicates:  No Known Allergies    Past medical, family, and social history reviewed as documented in chart with pertinent positive medical, family, and social history detailed in HPI.    Physical Exam  Vitals:    05/31/23 1124   BP: 117/63   Pulse: 88          General: Well-developed, well-nourished, in no acute distress  HEENT: Normocephalic, atraumatic, extraocular eye movements in tact  Neck: supple, trachea midline, no cervical or supraclavicular adenopathy  Respirations: Even and unlabored  Rectal: 5/31/23-35gram prostate without nodules or tenderness. No gross blood.   Extremities: Moves all extremities equally, atraumatic, no clubbing, cyanosis, edema  Skin: warm and dry, no lesions  Psych: normal affect  Neuro: Alert and oriented x 3. Cranial nerves II-XII grossly intact    Urinalysis  Glucose 500, otherwise negative      Lab Results   Component Value Date    PSA 2.1 05/31/2022    PSA 1.1 05/14/2020    PSA 0.88 03/20/2019           Assessment:   1. Erectile dysfunction due to arterial insufficiency        2. BPH with obstruction/lower urinary tract symptoms        3. Prostate cancer screening  PSA, Screening              Plan:  Erectile dysfunction due to arterial insufficiency    BPH with obstruction/lower urinary tract symptoms    Prostate cancer screening  -     PSA, Screening; Future; Expected date: 05/31/2024    Other orders  -     tadalafiL (CIALIS) 20 MG Tab; Take 1 tablet (20 mg total) by mouth daily as needed (intercourse).  Dispense: 30 tablet; Refill: 11      PSA from today pending    Follow up in about 1 year (around 5/31/2024) for labs before visit.

## 2024-05-24 ENCOUNTER — LAB VISIT (OUTPATIENT)
Dept: LAB | Facility: HOSPITAL | Age: 74
End: 2024-05-24
Attending: UROLOGY
Payer: MEDICARE

## 2024-05-24 DIAGNOSIS — Z12.5 PROSTATE CANCER SCREENING: ICD-10-CM

## 2024-05-24 LAB — COMPLEXED PSA SERPL-MCNC: 2.4 NG/ML (ref 0–4)

## 2024-05-24 PROCEDURE — 84153 ASSAY OF PSA TOTAL: CPT | Performed by: UROLOGY

## 2024-05-24 PROCEDURE — 36415 COLL VENOUS BLD VENIPUNCTURE: CPT | Mod: PN | Performed by: UROLOGY

## 2024-06-11 ENCOUNTER — OFFICE VISIT (OUTPATIENT)
Dept: UROLOGY | Facility: CLINIC | Age: 74
End: 2024-06-11
Payer: MEDICARE

## 2024-06-11 VITALS
BODY MASS INDEX: 28.13 KG/M2 | DIASTOLIC BLOOD PRESSURE: 69 MMHG | RESPIRATION RATE: 18 BRPM | SYSTOLIC BLOOD PRESSURE: 138 MMHG | HEIGHT: 68 IN | WEIGHT: 185.63 LBS | TEMPERATURE: 98 F | HEART RATE: 76 BPM

## 2024-06-11 DIAGNOSIS — Z12.5 PROSTATE CANCER SCREENING: ICD-10-CM

## 2024-06-11 DIAGNOSIS — N52.01 ERECTILE DYSFUNCTION DUE TO ARTERIAL INSUFFICIENCY: ICD-10-CM

## 2024-06-11 DIAGNOSIS — N40.1 BPH WITH OBSTRUCTION/LOWER URINARY TRACT SYMPTOMS: Primary | ICD-10-CM

## 2024-06-11 DIAGNOSIS — N13.8 BPH WITH OBSTRUCTION/LOWER URINARY TRACT SYMPTOMS: Primary | ICD-10-CM

## 2024-06-11 LAB
BILIRUB UR QL STRIP: NEGATIVE
GLUCOSE UR QL STRIP: POSITIVE
KETONES UR QL STRIP: POSITIVE
LEUKOCYTE ESTERASE UR QL STRIP: NEGATIVE
PH, POC UA: 5
POC BLOOD, URINE: NEGATIVE
POC NITRATES, URINE: NEGATIVE
PROT UR QL STRIP: POSITIVE
SP GR UR STRIP: 1.02 (ref 1–1.03)
UROBILINOGEN UR STRIP-ACNC: 0.2 (ref 0.3–2.2)

## 2024-06-11 PROCEDURE — 81003 URINALYSIS AUTO W/O SCOPE: CPT | Mod: PBBFAC,PN | Performed by: UROLOGY

## 2024-06-11 PROCEDURE — 99214 OFFICE O/P EST MOD 30 MIN: CPT | Mod: S$PBB,,, | Performed by: UROLOGY

## 2024-06-11 PROCEDURE — 99215 OFFICE O/P EST HI 40 MIN: CPT | Mod: PBBFAC,PN | Performed by: UROLOGY

## 2024-06-11 PROCEDURE — 99999 PR PBB SHADOW E&M-EST. PATIENT-LVL V: CPT | Mod: PBBFAC,,, | Performed by: UROLOGY

## 2024-06-11 PROCEDURE — 99999PBSHW POCT URINALYSIS, DIPSTICK, AUTOMATED, W/O SCOPE: Mod: PBBFAC,,,

## 2024-06-11 RX ORDER — TADALAFIL 20 MG/1
20 TABLET ORAL DAILY PRN
Qty: 30 TABLET | Refills: 11 | Status: SHIPPED | OUTPATIENT
Start: 2024-06-11 | End: 2025-06-11

## 2024-06-11 NOTE — PROGRESS NOTES
Chief Complaint   Patient presents with    Annual Exam         History of Present Illness:   Richard Renae is a 73 y.o. male here for follow up.     6/11/24-IPSS 24, QoL 4 (mostly dissatisfied). He reports urinary frequency Q1-2 hours. No incontinence. Nocturia x 1. He is supposed to be on lasix BID, but states that he doesn't take it. He is currently taking a testosterone booster OTC. No longer on testosterone gel. States that he tried a prostate medication in the past, but didn't like the ejaculatory side effects. He does use cialis, but it doesn't work great. However, didn't like ICI.     5/31/23-here for yearly checkup. Cialis 20mg prn doesn't work great, but works somewhat. Didn't like ICI. He takes bid lasix and has urinary frequency and nocturia. No gross hematuria. He does have some intermittency. Not bad enough to want meds. He continues to be on testosterone replacement.     5/31/22-Pt reports that he was hospitalized a few months ago for iron deficiency anemia and had a blood transfusion. He reports that he has urinary frequency, which he attributes to lasix. Taking cialis prn and works somewhat. No gross hematuria. [facility]     [facility]21-[facility]  States that the testosterone helps with muscle mass. Helps somewhat with energy. He was hospitalized at [facility] with CHF exacerbation. Uses cialis and it works okay. Not using trimix. No bothersome LUTS.     Per Dr. Gunderson:  5/20/20: T gel going well.  PSA about the same.  ED stable.  Reviewed history in detail.   11/20/19: Labs approp, [facility] and the recheck labs look good.  Not using the trimix for ED.  5/16/19: Trimix rx worked okay.  Free T is low, discussed therapy.    3/20/19: Back for a checkup.  Never sucessfully adopted the trimix.  Asks about T but doesn't want to check on it.  Reviewed history in detail.  9/9/16: Went to [facility] and started  their trimix and it worked some but not great.  He hasn't followed up.  Had some outside labs from 6/16 that shows T of 398. Backed off the caffeine and started the cialis daily and his LUTS are better.  Satisfied overall.  Would like to try trimix here.  9/9/15: 69 y.o. man referred for prostate cancer screening.  Had seen Dr. Irene for many years but wants to change because of scheduling problems there. No abd/pelvic pain and no exac/rel factors.  No urolithiasis. No gross hematuria. No family history of prostate cancer.  Nocturia x0, but daytime frequency.  Stream is is okay.  Has been going on for a long time.  Drinks 3 cups of coffee mainly in the morning but also in the afternoon and evening. Took flomax in the past but didn't like the retrograde ejaculation.  ED with soft erections that don't last long enough.  Takes the hydrocodone 2-3 times a day.  Took a sample of cialis daily and it helped his ED some. Would have intercourse a couple times a week if all was well.  Says he had a high PSA on one test and low on a recheck, never a biopsy.    Review of Systems   Constitutional:  Positive for fatigue.   HENT:  Negative for nosebleeds.    Eyes:  Negative for photophobia.   Respiratory:  Negative for cough.    Cardiovascular:  Negative for chest pain.   Gastrointestinal:  Negative for abdominal pain.   Genitourinary:  Negative for difficulty urinating.   Musculoskeletal:  Positive for arthralgias, gait problem and joint swelling.   Neurological:  Negative for speech difficulty.   Psychiatric/Behavioral:  Negative for confusion.    All other systems reviewed and are negative.      Current Outpatient Medications   Medication Sig Dispense Refill    albuterol 90 mcg/actuation inhaler Inhale 2 puffs into the lungs every 6 (six) hours as needed for Wheezing. 1 Inhaler 0    atorvastatin (LIPITOR) 40 MG tablet Take 40 mg by mouth once daily.      CINNAMON BARK-CHROMIUM-ALA ORAL Take by mouth.      eszopiclone  (LUNESTA) 3 mg Tab Take 3 mg by mouth every evening.      ferrous gluconate (FERGON) 324 MG tablet Take 5,000 mg by mouth.      glipizide-metformin (METAGLIP) 5-500 mg per tablet Take 1 tablet by mouth 2 (two) times daily before meals.      hydrocodone-acetaminophen  (LORTAB)  mg per tablet Take 1 tablet by mouth every 6 (six) hours as needed for Pain.      magnesium 30 mg Tab Take 400 mg by mouth.      multivitamin (THERAGRAN) per tablet Take 1 tablet by mouth once daily.      mupirocin (BACTROBAN) 2 % ointment Apply topically 3 (three) times daily. 22 g 0    resveratrol 100 mg Cap Take by mouth.      SITagliptin (JANUVIA) 100 MG Tab Take 100 mg by mouth every evening.      triamcinolone acetonide 0.1% (KENALOG) 0.1 % cream       UNABLE TO FIND Take 595 mg by mouth once daily. medication name: POTASSIUM 595MG      vitamin D 1000 units Tab Take 185 mg by mouth once daily.      vitamin E 400 UNIT capsule Take 400 Units by mouth once daily.      cetirizine (ZYRTEC) 10 MG tablet Take 1 tablet (10 mg total) by mouth every evening. 30 tablet 0    cilostazol (PLETAL) 50 MG Tab Take 50 mg by mouth 2 (two) times daily.      furosemide (LASIX) 20 MG tablet TAKE ONE TABLET BY MOUTH ONCE DAILY      gabapentin (NEURONTIN) 300 MG capsule Take 300 mg by mouth 2 (two) times daily.      GLUCOSAMINE HCL/CHONDR [place] A NA (OSTEO BI-FLEX ORAL) Take by mouth.      losartan (COZAAR) 25 MG tablet Take 25 mg by mouth every evening.      M-17/NETTLE/PUMPK/SAW PALMET (PROSTATE THERAPY ORAL) Take by mouth.      metoprolol succinate (TOPROL-XL) 25 MG 24 hr tablet Take 25 mg by mouth.      naproxen (NAPROSYN) 250 MG tablet Take 250 mg by mouth.      naproxen sodium (ANAPROX) 220 MG tablet Take 220 mg by mouth 2 (two) times daily with meals.      rivaroxaban (XARELTO) 20 mg Tab Take 20 mg by mouth daily with dinner or evening meal.      tadalafiL (CIALIS) 20 MG Tab Take 1 tablet (20 mg total) by mouth daily as needed (intercourse). 30  tablet 11    testosterone (ANDROGEL) 1 % (50 mg/5 gram) GlPk Apply 5 g topically once daily. 30 packet 5    VASCULERA 630 mg Tab        No current facility-administered medications for this visit.       Review of patient's allergies indicates:  No Known Allergies    Past medical, family, and social history reviewed as documented in chart with pertinent positive medical, family, and social history detailed in HPI.    Physical Exam  Vitals:    06/11/24 1132   BP: 138/69   Pulse: 76   Resp: 18   Temp: 98 °F (36.7 °C)         General: Well-developed, well-nourished, in no acute distress  HEENT: Normocephalic, atraumatic, extraocular eye movements in tact  Neck: supple, trachea midline, no cervical or supraclavicular adenopathy  Respirations: Even and unlabored  Rectal: 6/11/24-35gram prostate without nodules or tenderness. No gross blood.   Extremities: Moves all extremities equally, atraumatic, no clubbing, cyanosis, edema  Skin: warm and dry, no lesions  Psych: normal affect  Neuro: Alert and oriented x 3. Cranial nerves II-XII grossly intact    Urinalysis  Glucose 500, blood neg, nit neg, LE negative    Lab Results   Component Value Date    PSA 2.4 05/24/2024    PSA 1.6 05/31/2023    PSA 2.1 05/31/2022           Assessment:   1. BPH with obstruction/lower urinary tract symptoms  POCT Urinalysis, Dipstick, Automated, W/O Scope      2. Erectile dysfunction due to arterial insufficiency        3. Prostate cancer screening  PSA, Screening              Plan:  BPH with obstruction/lower urinary tract symptoms  -     POCT Urinalysis, Dipstick, Automated, W/O Scope    Erectile dysfunction due to arterial insufficiency    Prostate cancer screening  -     PSA, Screening; Future; Expected date: 06/04/2025    Other orders  -     tadalafiL (CIALIS) 20 MG Tab; Take 1 tablet (20 mg total) by mouth daily as needed (intercourse).  Dispense: 30 tablet; Refill: 11    Discussed 5ARI and alpha blocker therapy. Pt not currently  interested. Not the best surgical candidate.   Pt may stop his testosterone booster.       Follow up in about 1 year (around 6/11/2025) for labs before visit.

## 2025-06-11 ENCOUNTER — OFFICE VISIT (OUTPATIENT)
Dept: UROLOGY | Facility: CLINIC | Age: 75
End: 2025-06-11
Payer: MEDICARE

## 2025-06-11 ENCOUNTER — LAB VISIT (OUTPATIENT)
Dept: LAB | Facility: HOSPITAL | Age: 75
End: 2025-06-11
Attending: UROLOGY
Payer: MEDICARE

## 2025-06-11 VITALS
HEIGHT: 68 IN | HEART RATE: 76 BPM | SYSTOLIC BLOOD PRESSURE: 133 MMHG | DIASTOLIC BLOOD PRESSURE: 56 MMHG | WEIGHT: 191.13 LBS | BODY MASS INDEX: 28.97 KG/M2 | RESPIRATION RATE: 18 BRPM

## 2025-06-11 DIAGNOSIS — N13.8 BPH WITH OBSTRUCTION/LOWER URINARY TRACT SYMPTOMS: Primary | ICD-10-CM

## 2025-06-11 DIAGNOSIS — N40.1 BPH WITH OBSTRUCTION/LOWER URINARY TRACT SYMPTOMS: Primary | ICD-10-CM

## 2025-06-11 DIAGNOSIS — N52.01 ERECTILE DYSFUNCTION DUE TO ARTERIAL INSUFFICIENCY: ICD-10-CM

## 2025-06-11 DIAGNOSIS — E11.9 TYPE 2 DIABETES MELLITUS WITHOUT COMPLICATION, UNSPECIFIED WHETHER LONG TERM INSULIN USE: ICD-10-CM

## 2025-06-11 DIAGNOSIS — Z12.5 PROSTATE CANCER SCREENING: ICD-10-CM

## 2025-06-11 LAB
BILIRUBIN, UA POC OHS: NEGATIVE
BLOOD, UA POC OHS: NEGATIVE
CLARITY, UA POC OHS: CLEAR
COLOR, UA POC OHS: YELLOW
EAG (OHS): 212 MG/DL (ref 68–131)
GLUCOSE, UA POC OHS: 100
HBA1C MFR BLD: 9 % (ref 4–5.6)
KETONES, UA POC OHS: NEGATIVE
LEUKOCYTES, UA POC OHS: NEGATIVE
NITRITE, UA POC OHS: NEGATIVE
PH, UA POC OHS: 6
PROTEIN, UA POC OHS: 30
PSA SERPL-MCNC: 1.87 NG/ML
SPECIFIC GRAVITY, UA POC OHS: 1.02
UROBILINOGEN, UA POC OHS: 0.2

## 2025-06-11 PROCEDURE — 99214 OFFICE O/P EST MOD 30 MIN: CPT | Mod: S$PBB,,, | Performed by: UROLOGY

## 2025-06-11 PROCEDURE — 81003 URINALYSIS AUTO W/O SCOPE: CPT | Mod: PBBFAC,PN | Performed by: UROLOGY

## 2025-06-11 PROCEDURE — 36415 COLL VENOUS BLD VENIPUNCTURE: CPT | Mod: PN

## 2025-06-11 PROCEDURE — 84153 ASSAY OF PSA TOTAL: CPT

## 2025-06-11 PROCEDURE — 99999PBSHW POCT URINALYSIS(INSTRUMENT): Mod: PBBFAC,,,

## 2025-06-11 PROCEDURE — 99999 PR PBB SHADOW E&M-EST. PATIENT-LVL V: CPT | Mod: PBBFAC,,, | Performed by: UROLOGY

## 2025-06-11 PROCEDURE — 99215 OFFICE O/P EST HI 40 MIN: CPT | Mod: PBBFAC,PN | Performed by: UROLOGY

## 2025-06-11 PROCEDURE — 83036 HEMOGLOBIN GLYCOSYLATED A1C: CPT

## 2025-06-11 RX ORDER — TADALAFIL 20 MG/1
20 TABLET ORAL DAILY PRN
Qty: 30 TABLET | Refills: 11 | Status: SHIPPED | OUTPATIENT
Start: 2025-06-11 | End: 2026-06-11

## 2025-06-11 NOTE — PROGRESS NOTES
Chief Complaint   Patient presents with    Annual Exam         History of Present Illness:   Richard Renae is a 75 y.o. male here for follow up.     6/11/25-states that he doesn't feel like he empties and double voids to empty. Taking an OTC supplement for his prostate and it seems to help. Nocturia x 1. No urgency or UUI. Some post-void dribble. Takes cialis, but it doesn't really help enough. May help a little.     6/11/24-IPSS 24, QoL 4 (mostly dissatisfied). He reports urinary frequency Q1-2 hours. No incontinence. Nocturia x 1. He is supposed to be on lasix BID, but states that he doesn't take it. He is currently taking a testosterone booster OTC. No longer on testosterone gel. States that he tried a prostate medication in the past, but didn't like the ejaculatory side effects. He does use cialis, but it doesn't work great. However, didn't like ICI.     5/31/23-here for yearly checkup. Cialis 20mg prn doesn't work great, but works somewhat. Didn't like ICI. He takes bid lasix and has urinary frequency and nocturia. No gross hematuria. He does have some intermittency. Not bad enough to want meds. He continues to be on testosterone replacement.     5/31/22-Pt reports that he was hospitalized a few months ago for iron deficiency anemia and had a blood transfusion. He reports that he has urinary frequency, which he attributes to lasix. Taking cialis prn and works somewhat. No gross hematuria. [facility]     5[facility]27/21-[facility]  States that the testosterone helps with muscle mass. Helps somewhat with energy. He was hospitalized at [facility] with CHF exacerbation. Uses cialis and it works okay. Not using trimix. No bothersome LUTS.     Per Dr. Gunderson:  5/20/20: T gel going well.  PSA about the same.  ED stable.  Reviewed history in detail.   11/20/19: Labs approp, [facility] and the recheck labs look good.  Not using the trimix  for ED.  5/16/19: Trimix rx worked okay.  Free T is low, discussed therapy.    3/20/19: Back for a checkup.  Never sucessfully adopted the trimix.  Asks about T but doesn't want to check on it.  Reviewed history in detail.  9/9/16: Went to [facility] and started their trimix and it worked some but not great.  He hasn't followed up.  Had some outside labs from 6/16 that shows T of 398. Backed off the caffeine and started the cialis daily and his LUTS are better.  Satisfied overall.  Would like to try trimix here.  9/9/15: 69 y.o. man referred for prostate cancer screening.  Had seen Dr. Irene for many years but wants to change because of scheduling problems there. No abd/pelvic pain and no exac/rel factors.  No urolithiasis. No gross hematuria. No family history of prostate cancer.  Nocturia x0, but daytime frequency.  Stream is is okay.  Has been going on for a long time.  Drinks 3 cups of coffee mainly in the morning but also in the afternoon and evening. Took flomax in the past but didn't like the retrograde ejaculation.  ED with soft erections that don't last long enough.  Takes the hydrocodone 2-3 times a day.  Took a sample of cialis daily and it helped his ED some. Would have intercourse a couple times a week if all was well.  Says he had a high PSA on one test and low on a recheck, never a biopsy.    Review of Systems   Constitutional:  Positive for fatigue.   HENT:  Negative for nosebleeds.    Eyes:  Negative for photophobia.   Respiratory:  Negative for cough.    Cardiovascular:  Negative for chest pain.   Gastrointestinal:  Negative for abdominal pain.   Genitourinary:  Negative for difficulty urinating.   Musculoskeletal:  Positive for arthralgias, gait problem and joint swelling.   Neurological:  Negative for speech difficulty.   Psychiatric/Behavioral:  Negative for confusion.    All other systems reviewed and are negative.      Current Outpatient Medications   Medication Sig Dispense Refill     albuterol 90 mcg/actuation inhaler Inhale 2 puffs into the lungs every 6 (six) hours as needed for Wheezing. 1 Inhaler 0    atorvastatin (LIPITOR) 40 MG tablet Take 40 mg by mouth once daily.      CINNAMON BARK-CHROMIUM-ALA ORAL Take by mouth.      eszopiclone (LUNESTA) 3 mg Tab Take 3 mg by mouth every evening.      ferrous gluconate (FERGON) 324 MG tablet Take 5,000 mg by mouth.      glipizide-metformin (METAGLIP) 5-500 mg per tablet Take 1 tablet by mouth 2 (two) times daily before meals.      hydrocodone-acetaminophen  (LORTAB)  mg per tablet Take 1 tablet by mouth every 6 (six) hours as needed for Pain.      magnesium 30 mg Tab Take 400 mg by mouth.      multivitamin (THERAGRAN) per tablet Take 1 tablet by mouth once daily.      mupirocin (BACTROBAN) 2 % ointment Apply topically 3 (three) times daily. 22 g 0    resveratrol 100 mg Cap Take by mouth.      SITagliptin (JANUVIA) 100 MG Tab Take 100 mg by mouth every evening.      triamcinolone acetonide 0.1% (KENALOG) 0.1 % cream       UNABLE TO FIND Take 595 mg by mouth once daily. medication name: POTASSIUM 595MG      vitamin D 1000 units Tab Take 185 mg by mouth once daily.      vitamin E 400 UNIT capsule Take 400 Units by mouth once daily.      cetirizine (ZYRTEC) 10 MG tablet Take 1 tablet (10 mg total) by mouth every evening. 30 tablet 0    cilostazol (PLETAL) 50 MG Tab Take 50 mg by mouth 2 (two) times daily.      furosemide (LASIX) 20 MG tablet TAKE ONE TABLET BY MOUTH ONCE DAILY      losartan (COZAAR) 25 MG tablet Take 25 mg by mouth every evening.      metoprolol succinate (TOPROL-XL) 25 MG 24 hr tablet Take 25 mg by mouth.      naproxen sodium (ANAPROX) 220 MG tablet Take 220 mg by mouth 2 (two) times daily with meals.      tadalafiL (CIALIS) 20 MG Tab Take 1 tablet (20 mg total) by mouth daily as needed (intercourse). 30 tablet 11     No current facility-administered medications for this visit.       Review of patient's allergies  indicates:  No Known Allergies    Past medical, family, and social history reviewed as documented in chart with pertinent positive medical, family, and social history detailed in HPI.    Physical Exam  Vitals:    06/11/25 1131   BP: (!) 133/56   Pulse: 76   Resp: 18         General: Well-developed, well-nourished, in no acute distress  HEENT: Normocephalic, atraumatic, extraocular eye movements in tact  Neck: supple, trachea midline, no cervical or supraclavicular adenopathy  Respirations: Even and unlabored  Rectal: 6/11/25-35gram prostate without nodules or tenderness. No gross blood.   Extremities: Moves all extremities equally, atraumatic, no clubbing, cyanosis, edema  Skin: warm and dry, no lesions  Psych: normal affect  Neuro: Alert and oriented x 3. Cranial nerves II-XII grossly intact    Urinalysis  Glucose 100, protein 30, negative for blood, LE, nit    Lab Results   Component Value Date    PSA 1.87 06/11/2025    PSA 2.4 05/24/2024    PSA 1.6 05/31/2023           Assessment:   1. BPH with obstruction/lower urinary tract symptoms  POCT Urinalysis(Instrument)      2. Prostate cancer screening  PSA, Screening    PSA, Screening      3. Type 2 diabetes mellitus without complication, unspecified whether long term insulin use  Hemoglobin A1C      4. Erectile dysfunction due to arterial insufficiency                Plan:  BPH with obstruction/lower urinary tract symptoms  -     POCT Urinalysis(Instrument)    Prostate cancer screening  -     PSA, Screening; Future; Expected date: 06/12/2026  -     PSA, Screening; Future; Expected date: 06/11/2025    Type 2 diabetes mellitus without complication, unspecified whether long term insulin use  -     Hemoglobin A1C; Future; Expected date: 06/11/2025    Erectile dysfunction due to arterial insufficiency    Other orders  -     tadalafiL (CIALIS) 20 MG Tab; Take 1 tablet (20 mg total) by mouth daily as needed (intercourse).  Dispense: 30 tablet; Refill: 11          Follow up  in about 1 year (around 6/11/2026) for labs before visit.

## 2025-06-12 ENCOUNTER — RESULTS FOLLOW-UP (OUTPATIENT)
Dept: UROLOGY | Facility: HOSPITAL | Age: 75
End: 2025-06-12